# Patient Record
Sex: MALE | Race: WHITE | NOT HISPANIC OR LATINO | Employment: FULL TIME | URBAN - METROPOLITAN AREA
[De-identification: names, ages, dates, MRNs, and addresses within clinical notes are randomized per-mention and may not be internally consistent; named-entity substitution may affect disease eponyms.]

---

## 2017-07-11 ENCOUNTER — OFFICE VISIT - HEALTHEAST (OUTPATIENT)
Dept: INTERNAL MEDICINE | Facility: CLINIC | Age: 47
End: 2017-07-11

## 2017-07-11 DIAGNOSIS — B08.4 HAND, FOOT AND MOUTH DISEASE: ICD-10-CM

## 2017-07-11 DIAGNOSIS — B34.1 COXSACKIE VIRAL DISEASE: ICD-10-CM

## 2017-08-25 ENCOUNTER — COMMUNICATION - HEALTHEAST (OUTPATIENT)
Dept: UROLOGY | Facility: CLINIC | Age: 47
End: 2017-08-25

## 2017-09-08 ENCOUNTER — HOSPITAL ENCOUNTER (OUTPATIENT)
Dept: CT IMAGING | Facility: CLINIC | Age: 47
Discharge: HOME | End: 2017-09-08
Attending: PHYSICIAN ASSISTANT

## 2017-09-08 ENCOUNTER — OFFICE VISIT - HEALTHEAST (OUTPATIENT)
Dept: UROLOGY | Facility: CLINIC | Age: 47
End: 2017-09-08

## 2017-09-08 ENCOUNTER — AMBULATORY - HEALTHEAST (OUTPATIENT)
Dept: UROLOGY | Facility: CLINIC | Age: 47
End: 2017-09-08

## 2017-09-08 DIAGNOSIS — N20.0 CALCULUS OF KIDNEY: ICD-10-CM

## 2017-09-08 DIAGNOSIS — N20.9 URINARY CALCULUS, UNSPECIFIED: ICD-10-CM

## 2017-09-08 DIAGNOSIS — R10.9 LEFT FLANK PAIN: ICD-10-CM

## 2017-12-04 ENCOUNTER — COMMUNICATION - HEALTHEAST (OUTPATIENT)
Dept: UROLOGY | Facility: CLINIC | Age: 47
End: 2017-12-04

## 2017-12-04 ENCOUNTER — AMBULATORY - HEALTHEAST (OUTPATIENT)
Dept: LAB | Facility: HOSPITAL | Age: 47
End: 2017-12-04

## 2017-12-04 DIAGNOSIS — N20.9 URINARY TRACT STONES: ICD-10-CM

## 2017-12-11 ENCOUNTER — AMBULATORY - HEALTHEAST (OUTPATIENT)
Dept: LAB | Facility: HOSPITAL | Age: 47
End: 2017-12-11

## 2017-12-11 ENCOUNTER — COMMUNICATION - HEALTHEAST (OUTPATIENT)
Dept: UROLOGY | Facility: CLINIC | Age: 47
End: 2017-12-11

## 2017-12-11 DIAGNOSIS — N20.9 URINARY TRACT STONES: ICD-10-CM

## 2017-12-13 ENCOUNTER — COMMUNICATION - HEALTHEAST (OUTPATIENT)
Dept: UROLOGY | Facility: CLINIC | Age: 47
End: 2017-12-13

## 2018-02-27 ENCOUNTER — OFFICE VISIT - HEALTHEAST (OUTPATIENT)
Dept: FAMILY MEDICINE | Facility: CLINIC | Age: 48
End: 2018-02-27

## 2018-02-27 DIAGNOSIS — K21.9 GASTROESOPHAGEAL REFLUX DISEASE, ESOPHAGITIS PRESENCE NOT SPECIFIED: ICD-10-CM

## 2018-02-27 DIAGNOSIS — K52.9 GASTROENTERITIS: ICD-10-CM

## 2018-02-27 DIAGNOSIS — R51.9 ACUTE NONINTRACTABLE HEADACHE, UNSPECIFIED HEADACHE TYPE: ICD-10-CM

## 2018-02-27 ASSESSMENT — MIFFLIN-ST. JEOR: SCORE: 1721.29

## 2018-03-02 ENCOUNTER — OFFICE VISIT - HEALTHEAST (OUTPATIENT)
Dept: FAMILY MEDICINE | Facility: CLINIC | Age: 48
End: 2018-03-02

## 2018-03-02 ENCOUNTER — COMMUNICATION - HEALTHEAST (OUTPATIENT)
Dept: FAMILY MEDICINE | Facility: CLINIC | Age: 48
End: 2018-03-02

## 2018-03-02 DIAGNOSIS — R10.9 ABDOMINAL PAIN: ICD-10-CM

## 2018-03-02 DIAGNOSIS — R51.9 HEADACHE: ICD-10-CM

## 2018-03-02 DIAGNOSIS — R20.9 COLD AND CLAMMY SKIN: ICD-10-CM

## 2018-03-02 DIAGNOSIS — R23.1 COLD AND CLAMMY SKIN: ICD-10-CM

## 2018-03-02 LAB
ALBUMIN SERPL-MCNC: 3.6 G/DL (ref 3.5–5)
ALP SERPL-CCNC: 92 U/L (ref 45–120)
ALT SERPL W P-5'-P-CCNC: 837 U/L (ref 0–45)
AMYLASE SERPL-CCNC: 46 U/L (ref 5–120)
ANION GAP SERPL CALCULATED.3IONS-SCNC: 9 MMOL/L (ref 5–18)
AST SERPL W P-5'-P-CCNC: 513 U/L (ref 0–40)
BASOPHILS # BLD AUTO: 0 THOU/UL (ref 0–0.2)
BASOPHILS NFR BLD AUTO: 1 % (ref 0–2)
BILIRUB SERPL-MCNC: 0.5 MG/DL (ref 0–1)
BUN SERPL-MCNC: 10 MG/DL (ref 8–22)
CALCIUM SERPL-MCNC: 8.8 MG/DL (ref 8.5–10.5)
CHLORIDE BLD-SCNC: 101 MMOL/L (ref 98–107)
CO2 SERPL-SCNC: 28 MMOL/L (ref 22–31)
CREAT SERPL-MCNC: 0.76 MG/DL (ref 0.7–1.3)
EOSINOPHIL COUNT (ABSOLUTE): 0 THOU/UL (ref 0–0.4)
EOSINOPHIL NFR BLD AUTO: 1 % (ref 0–6)
ERYTHROCYTE [DISTWIDTH] IN BLOOD BY AUTOMATED COUNT: 12.6 % (ref 11–14.5)
FLUAV AG SPEC QL IA: NORMAL
FLUBV AG SPEC QL IA: NORMAL
GFR SERPL CREATININE-BSD FRML MDRD: >60 ML/MIN/1.73M2
GLUCOSE BLD-MCNC: 72 MG/DL (ref 70–125)
HCT VFR BLD AUTO: 44.6 % (ref 40–54)
HGB BLD-MCNC: 15.4 G/DL (ref 14–18)
LIPASE SERPL-CCNC: 26 U/L (ref 0–52)
LYMPHOCYTES # BLD AUTO: 6.1 THOU/UL (ref 0.8–4.4)
LYMPHOCYTES NFR BLD AUTO: 71 % (ref 20–40)
MCH RBC QN AUTO: 30.5 PG (ref 27–34)
MCHC RBC AUTO-ENTMCNC: 34.5 G/DL (ref 32–36)
MCV RBC AUTO: 88 FL (ref 80–100)
MONOCYTES # BLD AUTO: 0.7 THOU/UL (ref 0–0.9)
MONOCYTES NFR BLD AUTO: 8 % (ref 2–10)
MONOCYTES NFR BLD AUTO: NEGATIVE %
PLAT MORPH BLD: NORMAL
PLATELET # BLD AUTO: 210 THOU/UL (ref 140–440)
PMV BLD AUTO: 10.2 FL (ref 8.5–12.5)
POTASSIUM BLD-SCNC: 4.2 MMOL/L (ref 3.5–5)
PROT SERPL-MCNC: 7.2 G/DL (ref 6–8)
RBC # BLD AUTO: 5.05 MILL/UL (ref 4.4–6.2)
REACTIVE LYMPHS: ABNORMAL
SODIUM SERPL-SCNC: 138 MMOL/L (ref 136–145)
TOTAL NEUTROPHILS-ABS(DIFF): 1.8 THOU/UL (ref 2–7.7)
TOTAL NEUTROPHILS-REL(DIFF): 21 % (ref 50–70)
WBC: 8.7 THOU/UL (ref 4–11)

## 2018-03-03 ENCOUNTER — COMMUNICATION - HEALTHEAST (OUTPATIENT)
Dept: SCHEDULING | Facility: CLINIC | Age: 48
End: 2018-03-03

## 2018-03-07 ENCOUNTER — COMMUNICATION - HEALTHEAST (OUTPATIENT)
Dept: FAMILY MEDICINE | Facility: CLINIC | Age: 48
End: 2018-03-07

## 2018-03-08 ENCOUNTER — OFFICE VISIT - HEALTHEAST (OUTPATIENT)
Dept: FAMILY MEDICINE | Facility: CLINIC | Age: 48
End: 2018-03-08

## 2018-03-08 DIAGNOSIS — R74.8 ELEVATED LIVER ENZYMES: ICD-10-CM

## 2018-03-08 DIAGNOSIS — R89.4 POSITIVE TEST FOR HERPES SIMPLEX VIRUS (HSV) ANTIBODY: ICD-10-CM

## 2018-03-08 DIAGNOSIS — R74.01 TRANSAMINITIS: ICD-10-CM

## 2018-03-08 LAB
ALBUMIN SERPL-MCNC: 3.7 G/DL (ref 3.5–5)
ALP SERPL-CCNC: 99 U/L (ref 45–120)
ALT SERPL W P-5'-P-CCNC: 315 U/L (ref 0–45)
AST SERPL W P-5'-P-CCNC: 58 U/L (ref 0–40)
BASOPHILS # BLD AUTO: 0.2 THOU/UL (ref 0–0.2)
BASOPHILS NFR BLD AUTO: 3 % (ref 0–2)
BILIRUB DIRECT SERPL-MCNC: 0.2 MG/DL
BILIRUB SERPL-MCNC: 0.5 MG/DL (ref 0–1)
EOSINOPHIL COUNT (ABSOLUTE): 0.2 THOU/UL (ref 0–0.4)
EOSINOPHIL NFR BLD AUTO: 4 % (ref 0–6)
ERYTHROCYTE [DISTWIDTH] IN BLOOD BY AUTOMATED COUNT: 12.6 % (ref 11–14.5)
HCT VFR BLD AUTO: 43.5 % (ref 40–54)
HGB BLD-MCNC: 15 G/DL (ref 14–18)
LYMPHOCYTES # BLD AUTO: 2.5 THOU/UL (ref 0.8–4.4)
LYMPHOCYTES NFR BLD AUTO: 39 % (ref 20–40)
MCH RBC QN AUTO: 30.6 PG (ref 27–34)
MCHC RBC AUTO-ENTMCNC: 34.5 G/DL (ref 32–36)
MCV RBC AUTO: 89 FL (ref 80–100)
MONOCYTES # BLD AUTO: 1.3 THOU/UL (ref 0–0.9)
MONOCYTES NFR BLD AUTO: 20 % (ref 2–10)
OVALOCYTES: ABNORMAL
PLAT MORPH BLD: NORMAL
PLATELET # BLD AUTO: 324 THOU/UL (ref 140–440)
PMV BLD AUTO: 9.5 FL (ref 8.5–12.5)
PROT SERPL-MCNC: 7.3 G/DL (ref 6–8)
RBC # BLD AUTO: 4.9 MILL/UL (ref 4.4–6.2)
REACTIVE LYMPHS: ABNORMAL
TOTAL NEUTROPHILS-ABS(DIFF): 2.3 THOU/UL (ref 2–7.7)
TOTAL NEUTROPHILS-REL(DIFF): 36 % (ref 50–70)
WBC: 6.4 THOU/UL (ref 4–11)

## 2018-03-09 ENCOUNTER — COMMUNICATION - HEALTHEAST (OUTPATIENT)
Dept: FAMILY MEDICINE | Facility: CLINIC | Age: 48
End: 2018-03-09

## 2018-03-09 LAB
HSV1 IGG SERPL QL IA: POSITIVE
HSV2 IGG SERPL QL IA: NEGATIVE

## 2018-03-11 LAB — ARUP MISCELLANEOUS TEST: NORMAL

## 2018-03-12 ENCOUNTER — COMMUNICATION - HEALTHEAST (OUTPATIENT)
Dept: FAMILY MEDICINE | Facility: CLINIC | Age: 48
End: 2018-03-12

## 2018-03-12 DIAGNOSIS — R79.89 ELEVATED LFTS: ICD-10-CM

## 2018-03-12 LAB
MISCELLANEOUS TEST DEPT. - HE HISTORICAL: NORMAL
MISCELLANEOUS TEST DEPT. - HE HISTORICAL: NORMAL
PERFORMING LAB: NORMAL
PERFORMING LAB: NORMAL
SPECIMEN STATUS: NORMAL
SPECIMEN STATUS: NORMAL
TEST NAME: NORMAL
TEST NAME: NORMAL

## 2018-03-16 ENCOUNTER — OFFICE VISIT - HEALTHEAST (OUTPATIENT)
Dept: UROLOGY | Facility: CLINIC | Age: 48
End: 2018-03-16

## 2018-03-16 DIAGNOSIS — R34 LOW URINE OUTPUT: ICD-10-CM

## 2018-03-16 DIAGNOSIS — N20.9 URINARY CALCULUS: ICD-10-CM

## 2018-03-16 DIAGNOSIS — N20.9 URINARY TRACT STONES: ICD-10-CM

## 2019-02-05 ENCOUNTER — OFFICE VISIT - HEALTHEAST (OUTPATIENT)
Dept: FAMILY MEDICINE | Facility: CLINIC | Age: 49
End: 2019-02-05

## 2019-02-05 DIAGNOSIS — R07.0 THROAT PAIN: ICD-10-CM

## 2019-02-05 DIAGNOSIS — J02.0 STREP PHARYNGITIS: ICD-10-CM

## 2019-02-05 LAB — DEPRECATED S PYO AG THROAT QL EIA: ABNORMAL

## 2019-02-05 RX ORDER — IBUPROFEN 200 MG
600 TABLET ORAL EVERY 6 HOURS PRN
Status: SHIPPED | COMMUNITY
Start: 2019-02-05 | End: 2022-12-09

## 2019-02-05 RX ORDER — ACETAMINOPHEN 325 MG/1
650 TABLET ORAL EVERY 6 HOURS PRN
Status: SHIPPED | COMMUNITY
Start: 2019-02-05 | End: 2022-12-09

## 2019-02-05 ASSESSMENT — MIFFLIN-ST. JEOR: SCORE: 1696.23

## 2019-05-06 ENCOUNTER — OFFICE VISIT - HEALTHEAST (OUTPATIENT)
Dept: FAMILY MEDICINE | Facility: CLINIC | Age: 49
End: 2019-05-06

## 2019-05-06 DIAGNOSIS — R74.8 ELEVATED LIVER ENZYMES: ICD-10-CM

## 2019-05-06 DIAGNOSIS — F90.9 ATTENTION DEFICIT HYPERACTIVITY DISORDER (ADHD), UNSPECIFIED ADHD TYPE: ICD-10-CM

## 2019-05-06 DIAGNOSIS — Z87.19 HISTORY OF ULCERATIVE COLITIS: ICD-10-CM

## 2019-05-06 LAB
AMPHETAMINES UR QL SCN: NORMAL
BARBITURATES UR QL: NORMAL
BENZODIAZ UR QL: NORMAL
CANNABINOIDS UR QL SCN: NORMAL
COCAINE UR QL: NORMAL
CREAT UR-MCNC: 105.5 MG/DL
METHADONE UR QL SCN: NORMAL
OPIATES UR QL SCN: NORMAL
OXYCODONE UR QL: NORMAL
PCP UR QL SCN: NORMAL

## 2019-05-08 ENCOUNTER — COMMUNICATION - HEALTHEAST (OUTPATIENT)
Dept: FAMILY MEDICINE | Facility: CLINIC | Age: 49
End: 2019-05-08

## 2019-05-15 ENCOUNTER — OFFICE VISIT - HEALTHEAST (OUTPATIENT)
Dept: FAMILY MEDICINE | Facility: CLINIC | Age: 49
End: 2019-05-15

## 2019-05-15 DIAGNOSIS — J01.90 ACUTE SINUSITIS WITH SYMPTOMS > 10 DAYS: ICD-10-CM

## 2019-05-15 ASSESSMENT — MIFFLIN-ST. JEOR: SCORE: 1690.56

## 2019-05-16 ENCOUNTER — COMMUNICATION - HEALTHEAST (OUTPATIENT)
Dept: FAMILY MEDICINE | Facility: CLINIC | Age: 49
End: 2019-05-16

## 2019-07-10 ENCOUNTER — COMMUNICATION - HEALTHEAST (OUTPATIENT)
Dept: FAMILY MEDICINE | Facility: CLINIC | Age: 49
End: 2019-07-10

## 2019-07-10 DIAGNOSIS — F90.9 ATTENTION DEFICIT HYPERACTIVITY DISORDER (ADHD), UNSPECIFIED ADHD TYPE: ICD-10-CM

## 2020-01-24 ENCOUNTER — COMMUNICATION - HEALTHEAST (OUTPATIENT)
Dept: FAMILY MEDICINE | Facility: CLINIC | Age: 50
End: 2020-01-24

## 2020-01-24 DIAGNOSIS — F90.9 ATTENTION DEFICIT HYPERACTIVITY DISORDER (ADHD), UNSPECIFIED ADHD TYPE: ICD-10-CM

## 2020-02-14 ENCOUNTER — COMMUNICATION - HEALTHEAST (OUTPATIENT)
Dept: FAMILY MEDICINE | Facility: CLINIC | Age: 50
End: 2020-02-14

## 2020-02-14 DIAGNOSIS — F90.9 ATTENTION DEFICIT HYPERACTIVITY DISORDER (ADHD), UNSPECIFIED ADHD TYPE: ICD-10-CM

## 2020-02-25 ENCOUNTER — OFFICE VISIT - HEALTHEAST (OUTPATIENT)
Dept: FAMILY MEDICINE | Facility: CLINIC | Age: 50
End: 2020-02-25

## 2020-02-25 DIAGNOSIS — Z90.49 HISTORY OF TOTAL COLECTOMY: ICD-10-CM

## 2020-02-25 DIAGNOSIS — E66.3 OVERWEIGHT (BMI 25.0-29.9): ICD-10-CM

## 2020-02-25 DIAGNOSIS — Z09 HOSPITAL DISCHARGE FOLLOW-UP: ICD-10-CM

## 2020-02-25 DIAGNOSIS — R21 RASH AND NONSPECIFIC SKIN ERUPTION: ICD-10-CM

## 2020-02-25 DIAGNOSIS — R74.01 ELEVATED ALT MEASUREMENT: ICD-10-CM

## 2020-02-25 DIAGNOSIS — K56.609 SBO (SMALL BOWEL OBSTRUCTION) (H): ICD-10-CM

## 2020-02-25 LAB
CHOLEST SERPL-MCNC: 231 MG/DL
FASTING STATUS PATIENT QL REPORTED: NO
FERRITIN SERPL-MCNC: 31 NG/ML (ref 27–300)
HBA1C MFR BLD: 5.1 % (ref 3.5–6)
HDLC SERPL-MCNC: 56 MG/DL
IRON SATN MFR SERPL: 22 % (ref 20–50)
IRON SERPL-MCNC: 89 UG/DL (ref 42–175)
LDLC SERPL CALC-MCNC: 131 MG/DL
TIBC SERPL-MCNC: 406 UG/DL (ref 313–563)
TRANSFERRIN SERPL-MCNC: 325 MG/DL (ref 212–360)
TRIGL SERPL-MCNC: 222 MG/DL

## 2020-02-25 RX ORDER — KETOCONAZOLE 20 MG/G
CREAM TOPICAL 2 TIMES DAILY
Qty: 30 G | Refills: 1 | Status: SHIPPED | OUTPATIENT
Start: 2020-02-25 | End: 2022-12-09

## 2020-02-26 LAB
25(OH)D3 SERPL-MCNC: 34.1 NG/ML (ref 30–80)
25(OH)D3 SERPL-MCNC: 34.1 NG/ML (ref 30–80)

## 2020-02-28 LAB
MAGNESIUM,RBC - HISTORICAL: 5.3 MG/DL (ref 3.5–7.1)
SPECIMEN STATUS: NORMAL

## 2020-09-11 ENCOUNTER — COMMUNICATION - HEALTHEAST (OUTPATIENT)
Dept: SCHEDULING | Facility: CLINIC | Age: 50
End: 2020-09-11

## 2020-09-11 ENCOUNTER — OFFICE VISIT - HEALTHEAST (OUTPATIENT)
Dept: INTERNAL MEDICINE | Facility: CLINIC | Age: 50
End: 2020-09-11

## 2020-09-11 DIAGNOSIS — Z91.199 NO-SHOW FOR APPOINTMENT: ICD-10-CM

## 2020-09-23 ENCOUNTER — COMMUNICATION - HEALTHEAST (OUTPATIENT)
Dept: FAMILY MEDICINE | Facility: CLINIC | Age: 50
End: 2020-09-23

## 2021-05-28 NOTE — PROGRESS NOTES
"Assessment / Impression     1. Acute sinusitis with symptoms > 10 days  amoxicillin-clavulanate (AUGMENTIN) 875-125 mg per tablet         Plan:     He was given a prescription for Augmentin.  I encouraged rest and hydration.  He may take Tylenol or ibuprofen as needed.  He may continue decongestants as needed.    Subjective:      HPI: Adam Deras is a 48 y.o. male who presents to the clinic complaining of worsening sinus pain, congestion and pressure over the past 2 weeks.  He denies having fevers.  He denies having chest congestion feeling acutely short of breath.  He has noticed some ear plugging and pain into the teeth.      Review of Systems  Pertinent items are noted in HPI.       Objective:     /84 (Patient Site: Right Arm, Patient Position: Sitting, Cuff Size: Adult Regular)   Pulse 72   Temp 97.9  F (36.6  C) (Oral)   Resp 16   Ht 5' 9.25\" (1.759 m)   Wt 183 lb 4 oz (83.1 kg)   BMI 26.87 kg/m      General Appearance: Alert, cooperative, appears slightly fatigued.  Head: Normocephalic, without obvious abnormality, atraumatic.  Frontal and maxillary sinuses tender to palpation  Eyes: PERRL, conjunctiva/corneas clear, EOM's intact.  Ears: Normal TM's and external ear canals, both ears.  Throat: Slightly erythematous. No exudates.  Neck: Supple, symmetrical, trachea midline, no lymphadenopathy.  Lungs: Clear to auscultation bilaterally, respirations unlabored.  Heart:: Regular rate and rhythm.  Extremities: Extremities normal, atraumatic, no cyanosis or edema.        No results found for this or any previous visit (from the past 168 hour(s)).      "

## 2021-05-28 NOTE — PROGRESS NOTES
Assessment:     1. Attention deficit hyperactivity disorder (ADHD), unspecified ADHD type  Ambulatory referral to Psychology    methylphenidate HCl (RITALIN) 10 MG tablet    Drug Abuse 1+, Urine   2. Elevated liver enzymes     3. History of ulcerative colitis       PHQ and KELLY screening reviewed.  Unlikely, that depression or anxiety can be contributing to current lack of concentration.  Definitely fatigue can be contributing and may need further work-up if it is other than lack of sleep.  This is discussed with the patient.  At this time I will try a low-dose Ritalin to see if it helps, meanwhile we will do a referral for neuropsych evaluation.  Patient is agreeable to this plan of care.    Patient does have a history of ulcerative colitis.  Last year during an acute episode of abdominal pain, he was hospitalized after elevated liver enzymes were noted.  This was thought to be a viral disease.  I am unable to find any follow-up in the media, I am going to send a message to the patient regarding follow-up for this.     Plan:      The diagnosis was discussed with the patient and evaluation and treatment plans outlined.   Patient Instructions   I am seeing you today for starting Ritalin that you were on many years ago.    We will need a neuropsych evaluation for diagnosis of ADD versus ADHD versus any underlying mental health disorder for long-term use.    A controlled substance agreement will be signed today in addition to urine drug screen.    Maggi Moss MD  5/6/2019                   Subjective:      Adam Deras is a  male who presents for evaluation of   Chief Complaint   Patient presents with     Medication Management     Medication for ADD     Adam is here today with concerns of feeling of lack of concentration.  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  Most of it he thinks may be due to fatigue.  He has a set of twins who are 2 years old and he feels that 1 of them  has not been sleeping through the night over the last 3 months and he has been exhausted.    On the other hand, he describes that he was 1 of the trial patient who was started on Ritalin more than 25 years ago.  However, he did not pursue with it later.  He was diagnosed with ADD per history.  No recent follow-ups.    No concerns with substance abuse.    The following portions of the patient's history were reviewed and updated as appropriate: allergies, current medications, past family history, past medical history, past social history, past surgical history and problem list.  No Known Allergies    Current Outpatient Medications on File Prior to Visit   Medication Sig Dispense Refill     acetaminophen (TYLENOL) 325 MG tablet Take 650 mg by mouth every 6 (six) hours as needed for pain.       ibuprofen (ADVIL,MOTRIN) 200 MG tablet Take 800 mg by mouth every 6 (six) hours as needed for pain.       omeprazole (PRILOSEC) 20 MG capsule Take 1 capsule (20 mg total) by mouth daily. 30 capsule 1     pseudoephedrine (SUDAFED) 120 mg 12 hr tablet Take 120 mg by mouth every 12 (twelve) hours.       No current facility-administered medications on file prior to visit.        Patient Active Problem List   Diagnosis     History of ulcerative colitis     History of total colectomy     Anemia     Nephrolithiasis     Leg weakness, bilateral     Renal colic on left side     Elevated liver enzymes     Right upper quadrant abdominal pain     Positive test for herpes simplex virus (HSV) antibody       Past Medical History:   Diagnosis Date     Anemia      Kidney stone     3/2016     Nephrolithiasis      Ulcerative colitis (H) 2001    History of        Past Surgical History:   Procedure Laterality Date     TOTAL COLECTOMY         Family History   Problem Relation Age of Onset     Dementia Paternal Uncle      Breast cancer Maternal Grandmother      Rheum arthritis Mother        Social History     Socioeconomic History     Marital status:       Spouse name: None     Number of children: None     Years of education: None     Highest education level: None   Occupational History     Occupation: not employed    Social Needs     Financial resource strain: None     Food insecurity:     Worry: None     Inability: None     Transportation needs:     Medical: None     Non-medical: None   Tobacco Use     Smoking status: Never Smoker     Smokeless tobacco: Never Used   Substance and Sexual Activity     Alcohol use: Yes     Comment: 4 pints a week     Drug use: No     Sexual activity: None   Lifestyle     Physical activity:     Days per week: None     Minutes per session: None     Stress: None   Relationships     Social connections:     Talks on phone: None     Gets together: None     Attends Qu[employer] service: None     Active member of club or organization: None     Attends meetings of clubs or organizations: None     Relationship status: None     Intimate partner violence:     Fear of current or ex partner: None     Emotionally abused: None     Physically abused: None     Forced sexual activity: None   Other Topics Concern     None   Social History Narrative    Works as a .    He is  and lives with his wife and twins who are 1-year-old.            Maggi Moss MD  3/7/2018           Review of Systems  Constitutional: negative  Cardiovascular: negative  Gastrointestinal: negative       Objective:     /88 (Patient Site: Left Arm, Patient Position: Sitting, Cuff Size: Adult Regular)   Pulse 70   Temp 97.7  F (36.5  C) (Oral)   Wt 190 lb 12.8 oz (86.5 kg)   BMI 27.97 kg/m      General Appearance: healthy, alert, oriented and no distress  Good eye contact, normal speech and content, no flight of ideas. Animated during conversation.  Neurological exam: gait normal, alert and oriented X 3    Little interest or pleasure in doing things: Several days  Feeling down, depressed, or hopeless: Several days  Trouble falling or  staying asleep, or sleeping too much: Not at all  Feeling tired or having little energy: Not at all  Poor appetite or overeating: Not at all  Feeling bad about yourself - or that you are a failure or have let yourself or your family down: More than half the days  Trouble concentrating on things, such as reading the newspaper or watching television: More than half the days  Moving or speaking so slowly that other people could have noticed. Or the opposite - being so fidgety or restless that you have been moving around a lot more than usual: Not at all  Thoughts that you would be better off dead, or of hurting yourself in some way: Not at all  PHQ-9 Total Score: 6  If you checked off any problems, how difficult have these problems made it for you to do your work, take care of things at home, or get along with other people?: Somewhat difficult    Total KELLY 7 Score       5/6/2019             KELLY 7 Total Score:  5

## 2021-05-28 NOTE — PATIENT INSTRUCTIONS - HE
I am seeing you today for starting Ritalin that you were on many years ago.    We will need a neuropsych evaluation for diagnosis of ADD versus ADHD versus any underlying mental health disorder for long-term use.    A controlled substance agreement will be signed today in addition to urine drug screen.    Maggi Moss MD  5/6/2019

## 2021-05-28 NOTE — TELEPHONE ENCOUNTER
Question following Office Visit  When did you see your provider: yesterday  What is your question: Patient is requesting Steve Bell DO to return his call regarding Sx. Patient declined to talk to triage and stated this medication is helping a little but not much and now I am having jaw pain.  Patient was advised this medication is prescribed for 10 days and will need longer to work but patient stated I just want a call back to discuss this with Steve Bell DO.    Okay to leave a detailed message: Yes

## 2021-05-28 NOTE — TELEPHONE ENCOUNTER
I spoke to the patient about his symptoms.  He is going to start taking ibuprofen 600-800 mg 3 times daily as needed over the next few days.  He may alternate with Tylenol.  He reports that symptoms seem to be starting to improve since he started the antibiotic yesterday.  He will let me know if symptoms do not continue to improve over the next few days.

## 2021-05-30 NOTE — TELEPHONE ENCOUNTER
Controlled Substance Refill Request  Medication:   Requested Prescriptions     Pending Prescriptions Disp Refills     methylphenidate HCl (RITALIN) 10 MG tablet 30 tablet 0     Sig: Take 1 tablet (10 mg total) by mouth daily.     Date Last Fill: 5/6/19  Pharmacy: [id number]   Submit electronically to pharmacy  Controlled Substance Agreement on File:   Encounter-Level CSA Scan Date:    There are no encounter-level csa scan date.       Last office visit: Last office visit pertaining to requested medication was 5/15/19.

## 2021-05-30 NOTE — TELEPHONE ENCOUNTER
Medication:   Requested Prescriptions     Pending Prescriptions Disp Refills     methylphenidate HCl (RITALIN) 10 MG tablet 30 tablet 0     Sig: Take 1 tablet (10 mg total) by mouth daily.         Last Date Filled 5/6/19 according to our records     pulled: NO  Have not been granted access yet      Only PCP Prescribing?: unknown     Taken as prescribed from physician notes?: YES  PHQ and KELLY screening reviewed.  Unlikely, that depression or anxiety can be contributing to current lack of concentration.  Definitely fatigue can be contributing and may need further work-up if it is other than lack of sleep.  This is discussed with the patient.  At this time I will try a low-dose Ritalin to see if it helps, meanwhile we will do a referral for neuropsych evaluation.  Patient is agreeable to this plan of care.    CSA in last year: YES    Random Utox in last year: YES    Opioids + benzodiazepines? NO

## 2021-05-31 VITALS — BODY MASS INDEX: 26.55 KG/M2 | WEIGHT: 179.8 LBS

## 2021-05-31 VITALS — BODY MASS INDEX: 26.75 KG/M2 | WEIGHT: 186.44 LBS

## 2021-05-31 VITALS — BODY MASS INDEX: 27 KG/M2 | WEIGHT: 188.2 LBS

## 2021-05-31 VITALS — WEIGHT: 187.4 LBS | BODY MASS INDEX: 26.83 KG/M2 | HEIGHT: 70 IN

## 2021-06-02 VITALS — WEIGHT: 183.25 LBS | BODY MASS INDEX: 27.14 KG/M2 | HEIGHT: 69 IN

## 2021-06-02 VITALS — WEIGHT: 184.5 LBS | BODY MASS INDEX: 27.33 KG/M2 | HEIGHT: 69 IN

## 2021-06-02 VITALS — WEIGHT: 190.8 LBS | BODY MASS INDEX: 27.97 KG/M2

## 2021-06-04 VITALS
RESPIRATION RATE: 16 BRPM | DIASTOLIC BLOOD PRESSURE: 95 MMHG | HEART RATE: 92 BPM | BODY MASS INDEX: 27.69 KG/M2 | SYSTOLIC BLOOD PRESSURE: 134 MMHG | WEIGHT: 193 LBS | TEMPERATURE: 98.7 F

## 2021-06-05 NOTE — TELEPHONE ENCOUNTER
I do not feel comfortable doing this referral.  Refer to last note for details.  I believe this message has been relayed to the patient     Maggi Moss MD  1/26/2020

## 2021-06-05 NOTE — TELEPHONE ENCOUNTER
15 pills of Ritalin have been sent to the pharmacy.  For more refills, he should follow in clinic with me at least.    Maggi Moss MD  1/27/2020

## 2021-06-05 NOTE — TELEPHONE ENCOUNTER
Controlled Substance Refill Request  Medication Name:   Requested Prescriptions     Pending Prescriptions Disp Refills     methylphenidate HCl (RITALIN) 10 MG tablet 30 tablet 0     Sig: Take 1 tablet (10 mg total) by mouth daily.     Date Last Fill: 07/10/19  [facility]ed [facility]  Submit electronically to pharmacy  Controlled Substance Agreement on file:   Encounter-Level CSA Scan Date:    There are no encounter-level csa scan date.        Last office visit:  05/15/19

## 2021-06-05 NOTE — TELEPHONE ENCOUNTER
Pt was asking if you would be willing to bridge medication until he see psych. Please see messages below.

## 2021-06-05 NOTE — TELEPHONE ENCOUNTER
Spoke to pt and relayed MD message. Pt will call psych and set up appt today or tomorrow. I told pt we will need the report and he will need to see Dr ATKINSON after to sign CSA and do UDS. Please send limited refill

## 2021-06-05 NOTE — TELEPHONE ENCOUNTER
Patient can make a follow-up appointment to discuss this.  I can give a limited refill until he gets to see a specialist.    Maggi Moss MD  1/27/2020

## 2021-06-05 NOTE — TELEPHONE ENCOUNTER
Will let pt know that yes he needs to do eval and then see Dr ATKINSON to review report and sign CSA. Ok to bridge until appt?

## 2021-06-05 NOTE — TELEPHONE ENCOUNTER
Decline Rx refill.  Patient is to be seen.      He was evaluated last year 05/2019 and was given a trial of medication which was then refilled by another provider.  He had been referred for neuropsych evaluation.  I do not have a report if he has been followed by the specialist and an evaluation done .    He has not been seen since.    Maggi Moss MD  1/24/2020

## 2021-06-05 NOTE — TELEPHONE ENCOUNTER
Left message to call back for: Refill  Information to relay to patient:  MD message below. Please ask pt if he ever had neuro psych eval and if so where so we can get records

## 2021-06-06 NOTE — TELEPHONE ENCOUNTER
Referral Request  Type of referral: Mental health for meds  Who s requesting: Patient  Why the request: He states he needs a referral to get his medication . Patient very rude and rachelle over the phone to writer.  Please advise.   Have you been seen for this request: N/A  Does patient have a preference on a group/provider? No  Okay to leave a detailed message?  No

## 2021-06-06 NOTE — TELEPHONE ENCOUNTER
Ref to psych is done    Maggi Moss MD  2/15/2020    P.S: sent him my chart message that he can call his insurance too , otherwise scheduling will reach out to him.

## 2021-06-06 NOTE — PROGRESS NOTES
Hospital Follow-up Visit:    Assessment/Plan:     1. Hospital discharge follow-up     2. Overweight (BMI 25.0-29.9)  Glycosylated Hemoglobin A1c    Vitamin D, Total (25-Hydroxy)    Magnesium, Red Blood Cell    Iron and Transferrin Iron Binding Capacity    Ferritin    Lipid Cascade   3. Elevated ALT measurement     4. History of total colectomy  Ambulatory referral to General Surgery   5. Rash and nonspecific skin eruption  ketoconazole (NIZORAL) 2 % cream   6. SBO (small bowel obstruction) (H)       Patient here today for follow-up of hospital discharge for small bowel obstruction.  At this time he is recovering well.    He is concerned  overall with weight gain, and in general feeling as if he is breaking apart.  He is following with a holistic medicine/nutritionist and would like to get some baseline testing for hemoglobin A1c, vitamin D, red blood cell magnesium and iron studies.  These were ordered for him.  Though he denies depression and answers    His rash appears to be severe dermatitis and I have prescribed Nizoral.  If he has no benefit, he should let me know and I will make a referral to dermatology.    We did go over his hospital discharge where it is mentioned that he may have had some anger issues.  I did discuss that Ritalin that has been prescribed in the past for his ADHD symptoms is a stimulant and as advised in the past he should have evaluation for ADHD before further refills of medication.  He agrees with the plan.        Greater than 40 minutes was spent today in interview and examination with more than 50% of that time in counseling and coordination of care addressing recent hospital discharge for small bowel obstruction, supportive dermatitis, concerns with ADHD and need to follow-up for evaluation     Subjective:     Chief Complaint   Patient presents with     Follow-up     SMO at [facility] from 02/20-02/22,     Concerns     Patient is not fasting, would like tests done     Eczema     Dry  skin around the nose. x 4 months       Adam Deras is a 49 y.o. male who presents for a hospital discharge follow up.    He was admitted at [facility] for abdominal pain that was fairly intense accompanied by abdominal distention and bloating.  He has had previous small bowel obstruction related to his colectomy for ulcerative colitis.  He was admitted to hospital for conservative management and discharged after he was back to baseline.  During course of admission he had CT scan that showed bowel obstruction at the site of previous surgery.  He also had abdominal x-ray and contrast x-ray showing an adynamic ileus.  Currently he denies any abdominal pain.  His stools have returned to his baseline.    Patient has had some rash around his nose which is yellowish in color and looks scabby.  He is currently not using any medication for this.  He has had similar rash on the forehead line.    Hospital/Nursing Home/IP Rehab Facility: [facility]  Date of Admission: 2/20/2020  Date of Discharge:2/22  Reason(s) for Admission: Abdominal pain, small bowel obstruction.            Do you have any problems taking your medication regularly?  None       Have you had any changes in your medication since discharge? None       Have you had any difficulty following your discharge or treatment plan?  No    Summary of hospitalization:  Hospital discharge summary reviewed  Diagnostic Tests/Treatments reviewed.  Follow up needed: Suggested follow-up with surgeon.  Other Healthcare Providers Involved in Patient's Care: Patient Care Team:  Maggi Moss MD as PCP - General (Family Medicine)  Maggi Moss MD as Assigned PCP      Update since discharge: {improved   Information from family, SNF, care coordination:      Post Discharge Medication Reconciliation: discharge medications reconciled, continue medications without change  Plan of care communicated with: patient    Objective:     Vitals:    02/25/20 1340   BP:  (!) 134/95   Pulse: 92   Resp: 16   Temp: 98.7  F (37.1  C)   TempSrc: Oral   Weight: 193 lb (87.5 kg)         Physical Exam:  GENERAL APPEARANCE:  Appearing stated age, smiling, alert, cooperative, and in no acute distress.   HEENT: Pupils equal, regular, react to light and accommodation. Extraocular muscles intact, fundi benign. Ear canals and tympanic membranes are normal. Lips, mouth, and throat are unremarkable.   NECK: Neck supple without adenopathy, thyromegaly or masses.   LYMPH: No anterior cervical or supraclavicular LN enlargement   PULMONARY: Normal respiratory effort. Chest is clear.   CARDIOVASCULAR: Heart auscultation: rhythm regular, heart sounds normal S1 and S2, bruit carotid artery absent.   SKIN: Warm and well perfused..   ABDOMEN: Abdomen soft, non-tender. BS normal. No masses or organomegaly.   EXTREMITIES: Peripheral pulses are full. Extremities with no edema.   MENTAL STATUS: Alert, oriented and thought content appropriate   NEUROLOGIC: Station and gait normal, strength and movement normal, reflexes are normal and symmetric         Coding guidelines for this visit:  Type of Medical   Decision Making Face-to-Face Visit       within 7 Days of discharge Face-to-Face Visit        within 14 days of discharge   Moderate Complexity [id number]   High Complexity [id number]       Electronically signed by Maggi Moss MD 03/01/20 1:59 PM

## 2021-06-11 NOTE — TELEPHONE ENCOUNTER
COVID 19 Nurse Triage Plan/Patient Instructions    Please be aware that novel coronavirus (COVID-19) may be circulating in the community. If you develop symptoms such as fever, cough, or SOB or if you have concerns about the presence of another infection including coronavirus (COVID-19), please contact your health care provider or visit [email or web address].     Disposition/Instructions    Virtual Visit with provider recommended. Reference Visit Selection Guide.    Thank you for taking steps to prevent the spread of this virus.  o Limit your contact with others.  o Wear a simple mask to cover your cough.  o Wash your hands well and often.    Resources    [facility]: About COVID-19: [email or web address]    CDC: What to Do If You're Sick: www.cdc.gov/coronavirus/2019-ncov/about/steps-when-sick.html    CDC: Ending Home Isolation: www.cdc.gov/coronavirus/2019-ncov/hcp/disposition-in-home-patients.html     CDC: Caring for Someone: www.cdc.gov/coronavirus/2019-ncov/if-you-are-sick/care-for-someone.html     [facility]: Interim Guidance for Hospital Discharge to Home: www.[email or web address]/[email or web address]    [facility] clinical trials (COVID-19 research studies): clinicalaffairs.[facility]-clinical-trials     Below are the COVID-19 hotlines at the [place] Department [facility] ([facility]). Interpreters are available.   o For health questions: Call 607-422-2651 or 1-568.671.8836 (7 a.m. to 7 p.m.)  o For questions about schools and childcare: Call 268-114-1173 or 1-663.618.6554 (7 a.m. to 7 p.m.)         RN triage   Pt states been feeling ' woozy and tired' for the past few days   Feeling ' off'   Concerned about covid  Pt denies feeling dizzy or lightheaded-- but feels like he needs to sit down / rest sometimes  No fever   No cough   No chest symptoms   Pt states he is breathing OK   Reviewed home care advice and isolation advice   Transferred to    Marizol Ingram RN [facility]  Connection RN triage      Reason for Disposition    [1] COVID-19 infection suspected by caller or triager AND [2] mild symptoms (cough, fever, or others) AND [3] no complications or SOB    Additional Information    Negative: SEVERE difficulty breathing (e.g., struggling for each breath, speaks in single words)    Negative: Difficult to awaken or acting confused (e.g., disoriented, slurred speech)    Negative: Bluish (or gray) lips or face now    Negative: Shock suspected (e.g., cold/pale/clammy skin, too weak to stand, low BP, rapid pulse)    Negative: Sounds like a life-threatening emergency to the triager    Negative: [1] COVID-19 exposure AND [2] no symptoms    Negative: COVID-19 and Breastfeeding, questions about    Negative: [1] Adult with possible COVID-19 symptoms AND [2] triager concerned about severity of symptoms or other causes    Negative: SEVERE or constant chest pain or pressure (Exception: mild central chest pain, present only when coughing)    Negative: MODERATE difficulty breathing (e.g., speaks in phrases, SOB even at rest, pulse 100-120)    Negative: MILD difficulty breathing (e.g., minimal/no SOB at rest, SOB with walking, pulse <100)    Negative: Chest pain or pressure    Negative: HIGH RISK patient (e.g., age > 64 years, diabetes, heart or lung disease, weak immune system) (Exception: Has already been evaluated by healthcare provider and has no new or worsening symptoms)    Negative: Fever present > 3 days (72 hours)    Negative: [1] Fever returns after gone for over 24 hours AND [2] symptoms worse or not improved    Negative: [1] Continuous (nonstop) coughing interferes with work or school AND [2] no improvement using cough treatment per protocol    Protocols used: CORONAVIRUS (COVID-19) DIAGNOSED OR [id number] 8.4.20

## 2021-06-12 NOTE — PROGRESS NOTES
Assessment/Plan:        Diagnoses and all orders for this visit:    Calculus of kidney  -     24 Hour Urine Collection Steps Education  -     Symptom Control While Passing a Stone Education  -     HYDROmorphone (DILAUDID) 2 MG tablet; Take 1-2 tablets (2-4 mg total) by mouth every 4 (four) hours as needed for pain.  Dispense: 40 tablet; Refill: 0  -     ketorolac (TORADOL) 10 mg tablet; Take 1 tablet (10 mg total) by mouth every 6 (six) hours as needed.  Dispense: 20 tablet; Refill: 0    Urinary calculus, unspecified  -     Urinalysis Macroscopic      Stone Management Plan  [facility] Stone Management 11/30/2016 9/8/2017   Urinary Tract Infection No suspicion of infection No suspicion of infection   Renal Colic Asymptomatic at this time Well controlled symptoms   Renal Failure No suspicion of renal failure No suspicion of renal failure   Current CT date 11/28/2016 9/8/2017   Right sided stones? Yes -   R Number of ureteral stones 1 -   R GSD of ureteral stones 4 -   R Location of ureteral stone Distal -   R Number of kidney stones  2 -   R GSD of kidney stones 2 - 4 -   R Hydronephrosis Moderate -   R Stone Event New stone passed prior to visit -   Diagnosis date 11/28/2016 -   R Current Plan Observe -   Observe rationale Limited stone burden with good prognosis for spontaneous passage -   Left sided stones? Yes -   L Number of ureteral stones No ureteral stones -   L Number of kidney stones  1 -   L GSD of kidney stones 4 - 10 -   L Hydronephrosis None -   L Stone Event No current event -   L Current Plan Observe -   Observe rationale Limited stone burden with good prognosis for spontaneous passage -           Subjective:      HPI  Mr. Adam Deras is a 46 y.o.  male returning to the [facility] [place] for unanticipated visit with acute exacerbation of chronic stone disease.      He is a recurrent unidentified composition stone former who has not required stone clearance procedures. He has not  previously participated in stone risk evaluation. He has no identified modifiable stone risk factors. He has identified non-modifiable stone risks including:  multiple stones at presentation, bilateral stones, ulcerative colitis and colectomy.    Adam was last seen in November 2016 following spontaneous passage of a 3 mm right distal ureteral stone. Imaging at the time was notable for additional small bilateral renal stones. He was to initiate prevention workup but did not complete.    Primary symptom occurring 2 weeks ago was acute onset, severe left flank pain x few hours. The pain was a constant, sharp and cramping with radiation to left abdomen. It felt similar to prior kidney stone pain. He took percocet with no relief. He had associated nausea. Evaluation in ED on 8/28/17 was remarkable for hematuria with no signs of infection. A renal ultrasound was negative for hydronephrosis. He was discharged home with additional percocet. He assumes he passed a stone a couple days later, but did not retrieve a specimen. He is asymptomatic at present. He denies symptoms of fever, chills, flank pain, nausea, vomiting, urinary frequency and dysuria.    Sent patient down for updated imaging. CT scan from today is personally reviewed and demonstrates similar bilateral renal stone burden compared to 2016 imaging. There are two left upper pole renal calculi (< 2 mm and 4 mm). There are two right upper pole calculi, both 3 mm. No ureteral stones. No hydronephrosis.    Significant labs from presentation include severe hematuria, no pyuria, negative nitrite, no bacteria, normal C reactive protein, normal creatinine and normal potassium. Serum stone risk chemistries including PTH and ionized calcium from 11/30/16 were are normal.    PLAN    45 yo M with hx of ulcerative colitis s/p colectomy 2011 with first stone event in 2016, hx of stone passage x 2. Acute left flank pain 2 weeks ago requiring ED visit. No current hydronephrosis  with stable bilateral nephrolithiasis.    Recommend completing initial comprehensive stone risk evaluation. Two 24 hour urine collections and dietary journal will be obtained at earliest covenience. Patient verbalized understanding. Patient agrees with plan as discussed. He will return to clinic when labs are available.      He was prescribed toradol and dilaudid for future stone events. He was educated on symptom management protocol.    Greater than 25 minutes spent with patient and wife discussing evaluation and recommendations greater than 50% of the time spent in counseling patient    Patient also seen and examined by KELLIE Salas   Review of systems is negative except for HPI.    Past Medical History:   Diagnosis Date     Anemia      Kidney stone     3/2016     Nephrolithiasis      Ulcerative colitis 2001    History of      Past Surgical History:   Procedure Laterality Date     TOTAL COLECTOMY       Current Outpatient Prescriptions   Medication Sig Dispense Refill     ferrous sulfate 325 (65 FE) MG tablet Take 1 tablet by mouth 2 (two) times a day. 180 tablet 2     HYDROcodone-acetaminophen 5-325 mg per tablet Take 1 tablet by mouth every 6 (six) hours as needed for pain. 15 tablet 0     oxyCODONE-acetaminophen (PERCOCET) 5-325 mg per tablet Take 1 tablet by mouth every 4 (four) hours as needed for pain. 15 tablet 0     tamsulosin (FLOMAX) 0.4 mg Cp24 Take 1 capsule (0.4 mg total) by mouth daily. 5 capsule 0     No current facility-administered medications for this visit.      No Known Allergies    Social History     Social History     Marital status:      Spouse name: N/A     Number of children: N/A     Years of education: N/A     Occupational History     not employed       Social History Main Topics     Smoking status: Never Smoker     Smokeless tobacco: Not on file     Alcohol use Yes      Comment: social     Drug use: No     Sexual activity: Not on file     Other Topics Concern     Not  on file     Social History Narrative     Family History   Problem Relation Age of Onset     Dementia Paternal Uncle      Breast cancer Maternal Grandmother      Rheum arthritis Mother      Objective:      Physical Exam  There were no vitals filed for this visit.  General - well developed, well nourished, appropriate for age. Appears no distress at this time  Abdomen - slender soft, non-tender, no hepatosplenomegaly, no masses.   - no flank tenderness, no suprapubic tenderness, kidney and bladder non-palpable  MSK - normal spinal curvature. no spinal tenderness. normal gait. muscular strength intact.  Psych - oriented to time, place, and person, normal mood and affect.    Labs   Urinalysis POC (Office):  Blood UA   Date Value Ref Range Status   11/30/2016 Small Negative Final     Nitrite, UA   Date Value Ref Range Status   08/25/2017 Negative Negative Final   11/30/2016 Negative Negative Final   11/28/2016 Negative Negative Final   03/27/2016 Negative Negative Final     Leukocytes, UA    Date Value Ref Range Status   11/30/2016 Negative Negative Final     pH UA   Date Value Ref Range Status   11/30/2016 5.5 4.5 - 8.0 Final       Lab Urinalysis:  Blood, UA   Date Value Ref Range Status   08/25/2017 Large (!) Negative Final   11/28/2016 Small (!) Negative Final   03/27/2016 Negative Negative Final     Nitrite, UA   Date Value Ref Range Status   08/25/2017 Negative Negative Final   11/30/2016 Negative Negative Final   11/28/2016 Negative Negative Final   03/27/2016 Negative Negative Final     Leukocytes, UA   Date Value Ref Range Status   08/25/2017 Negative Negative Final   11/28/2016 Negative Negative Final   03/27/2016 Negative Negative Final     pH, UA   Date Value Ref Range Status   08/25/2017 5.5 4.5 - 8.0 Final   11/28/2016 5.5 4.5 - 8.0 Final   03/27/2016 5.5 4.5 - 8.0 Final    and Acute Labs   C Reactive Protein    CRP   Date Value Ref Range Status   08/25/2017 <0.1 0.0 - 0.8 mg/dL Final   11/28/2016 1.2 (H)  0.0 - 0.8 mg/dL Final   03/27/2016 <0.1 0.0 - 0.8 mg/dL Final    and Renal Panel  [facility]  Creatinine   Date Value Ref Range Status   08/25/2017 0.87 0.70 - 1.30 mg/dL Final   11/30/2016 0.89 0.70 - 1.30 mg/dL Final   11/30/2016 0.88 0.70 - 1.30 mg/dL Final     Potassium   Date Value Ref Range Status   08/25/2017 3.4 (L) 3.5 - 5.0 mmol/L Final   11/30/2016 4.0 3.5 - 5.0 mmol/L Final   11/28/2016 4.4 3.5 - 5.0 mmol/L Final     Calcium   Date Value Ref Range Status   08/25/2017 9.6 8.5 - 10.5 mg/dL Final   11/30/2016 9.5 8.5 - 10.5 mg/dL Final   11/30/2016 9.5 8.5 - 10.5 mg/dL Final

## 2021-06-16 PROBLEM — R74.8 ELEVATED LIVER ENZYMES: Status: ACTIVE | Noted: 2018-03-03

## 2021-06-16 PROBLEM — K56.609 SBO (SMALL BOWEL OBSTRUCTION) (H): Status: ACTIVE | Noted: 2020-02-21

## 2021-06-16 PROBLEM — R10.11 RIGHT UPPER QUADRANT ABDOMINAL PAIN: Status: ACTIVE | Noted: 2018-03-03

## 2021-06-16 PROBLEM — N23 RENAL COLIC ON LEFT SIDE: Status: ACTIVE | Noted: 2017-08-25

## 2021-06-16 PROBLEM — R89.4 POSITIVE TEST FOR HERPES SIMPLEX VIRUS (HSV) ANTIBODY: Status: ACTIVE | Noted: 2018-03-08

## 2021-06-16 NOTE — TELEPHONE ENCOUNTER
Telephone Encounter by Gayathri Rivera LPN at 1/24/2020  2:16 PM     Author: Gayathri Rivera LPN Service: -- Author Type: Licensed Nurse    Filed: 1/24/2020  2:19 PM Encounter Date: 1/24/2020 Status: Signed    : Gayathri Rivera LPN (Licensed Nurse)       Patient Returning Call  Reason for call:  Message from clinic  Information relayed to patient:  Florence Cazares MA           1/24/20 1:52 PM   Note      Left message to call back for: Refill  Information to relay to patient:  MD message below. Please ask pt if he ever had neuro psych eval and if so where so we can get records           Patient has additional questions:  Yes  If YES, what are your questions/concerns:  Patient has not had a neuro psych evaluation.  Does he need this as well as an office visit with Dr. Moss?  He can schedule on.  Could he get medication to bridge him to appointments?  Okay to leave a detailed message?: Yes

## 2021-06-16 NOTE — PROGRESS NOTES
Assessment:     1. Transaminitis  Hepatic Profile    HM1(CBC and Differential)    HM1 (CBC with Diff)    HSC 1 & 2 ( IG M)  and HSV PCR - Misc. Lab Test    Manual Differential    HSV - Misc. Lab Test    [facility] Misc Test    CANCELED: Herpes Simplex PCR (not CSF)    CANCELED: Herpes simplex Virus (Type 1 and 2) IgG Antibody    CANCELED: [facility] Misc Test   2. Elevated liver enzymes  HSV - Misc. Lab Test   3. Positive test for herpes simplex virus (HSV) antibody  HSC 1 & 2 ( IG M)  and HSV PCR - Misc. Lab Test    HSV - Misc. Lab Test    [facility] Misc Test    Herpes simplex Virus (Type 1 and 2) IgG Antibody       Transaminitis, likely secondary to a viral etiology.  He appears to be clinically improving.    As discussed in HPI, the HSV IgM is very nonspecific and this was discussed with the patient.  Further testing is ordered.  I called the  infectious disease specialist, Dr Mckeon, who was kind enough to suggest further checking for IgG for possible future reference.  These were then included as an add-on lab.    HSV hepatitis/aseptic meningitis may have contributed to his overall illness.  Other viral syndromes are all possibilities.  If liver enzymes continue to see high I will refer patient to GI.  If any of the HSV labs come back positive, he will benefit from seeing ID specialist as he was concerned.  He reports no past history of herpes or genital store sores or labial lesions.  No new contacts.    Plan:      The diagnosis was discussed with the patient and evaluation and treatment plans outlined.  See orders for lab and imaging studies.  Further follow-up plans will be based on outcome of lab/imaging studies; see orders.     Subjective:      Adam Deras is a  male who presents for evaluation of   Chief Complaint   Patient presents with     Follow-up     hospital follow up elevated liver enzymes, would like further explaination/testing of hsv 1 and 2 antibodies.      Patient is here for follow-up of recent  hospitalization.  He was seen at this clinic on 03/2/2017 for abdominal pain, headaches and general malaise . His initial workup had shown moderately increased transaminases and he was admitted to hospital for further workup.  His liver enzymes continue to rise and then showed a downtrending pattern where he was discharged to follow-up.    During his workup in the hospital IgM antibody for HSV was positive.  This needs further workup.  I reviewed the discussions between the hospitalist, GI and ID regarding this lab as follows-    Patient was admitted for evaluation of significant transaminitis associated with mild abdominal pain and some viral prodrome. GI saw the patient and sent some tests for viral causes of hepatitis. HSV IgM is mildly positive. Discussed with GI Dr Urrutia recommended discussing with ID. I discussed with ID, Dr Coffey stated this nonspecific HSV test (which does not indicate if HSV 1 or 2) is not very useful and would like the following blood tests to further work this up: HSV 1 IgM, HSV 2 IgM, and HSV PCR. Dr Coffey did not recommend treatment with acyclovir at this time.      Patient feels he is recovering somewhat.  He had a bad headache 2 days ago.  He has not had a headache since then.  His appetite is somewhat improved.  Today for the first day he has been able to return to work.  He denies any nausea or vomiting.  He believes with his headache he was having a sense of loss of smell as he is now able to smell.    No Known Allergies    Current Outpatient Prescriptions on File Prior to Visit   Medication Sig Dispense Refill     bismuth subsalicylate (PEPTO BISMOL) 262 mg/15 mL suspension Take 15-30 mL by mouth every 4 (four) hours as needed for indigestion.       calcium, as carbonate, (TUMS) 200 mg calcium (500 mg) chewable tablet Chew 1-2 tablets 4 (four) times a day as needed for heartburn.       omeprazole (PRILOSEC) 20 MG capsule Take 1 capsule (20 mg total) by mouth daily. 30 capsule 1      ondansetron (ZOFRAN, AS HYDROCHLORIDE,) 4 MG tablet Take 1 tablet (4 mg total) by mouth every 8 (eight) hours as needed for nausea. 20 tablet 0     oxyCODONE (ROXICODONE) 5 MG immediate release tablet Take 1 tablet (5 mg total) by mouth every 4 (four) hours as needed for pain. 16 tablet 0     psyllium (METAMUCIL) 3.4 gram packet Take 1 packet by mouth daily.       No current facility-administered medications on file prior to visit.        Patient Active Problem List   Diagnosis     History of ulcerative colitis     History of total colectomy     Anemia     Nephrolithiasis     Leg weakness, bilateral     Renal colic on left side     Elevated liver enzymes     Right upper quadrant abdominal pain     Positive test for herpes simplex virus (HSV) antibody       Past Medical History:   Diagnosis Date     Anemia      Kidney stone     3/2016     Nephrolithiasis      Ulcerative colitis 2001    History of        Past Surgical History:   Procedure Laterality Date     TOTAL COLECTOMY         Family History   Problem Relation Age of Onset     Dementia Paternal Uncle      Breast cancer Maternal Grandmother      Rheum arthritis Mother        Social History     Social History     Marital status:      Spouse name: N/A     Number of children: N/A     Years of education: N/A     Occupational History     not employed       Social History Main Topics     Smoking status: Never Smoker     Smokeless tobacco: Never Used     Alcohol use Yes      Comment: 4 pints a week     Drug use: No     Sexual activity: Not Asked     Other Topics Concern     None     Social History Narrative    Works as a .    He is  and lives with his wife and twins who are 1-year-old.            Maggi Moss MD  3/7/2018               Review of Systems  Constitutional: negative for chills, fevers and Malaise is still present but is improving  Ears, nose, mouth, throat, and face: negative, He describes himself as a nose    Respiratory: negative  Cardiovascular: negative  Gastrointestinal: negative for melena, vomiting and BM are now back to baseline, patient is status post colectomy  Genitourinary:negative, He denies any genital lesions or concerns  Integument/breast: negative, Denies any sores, new rashes  Hematologic/lymphatic: negative  Musculoskeletal:negative  Neurological: negative for dizziness, gait problems, paresthesia, speech problems and Headaches are now resolving, .       Objective:     /72 (Patient Site: Left Arm, Patient Position: Sitting, Cuff Size: Adult Regular)  Pulse 70  Resp 14  Wt 186 lb 7 oz (84.6 kg)  BMI 26.75 kg/m2  GENERAL APPEARANCE:  Appearing stated age, smiling, alert, cooperative, and in no acute distress.   HEENT: Pupils equal, regular, react to light and accommodation. Extraocular muscles intact, fundi benign. Ear canals and tympanic membranes are normal. Lips, mouth, and throat are unremarkable.   NECK: Neck supple without adenopathy, thyromegaly or masses.   LYMPH: No anterior cervical or supraclavicular LN enlargement   PULMONARY: Normal respiratory effort. Chest is clear.   CARDIOVASCULAR: Heart auscultation: rhythm regular, heart sounds normal S1 and S2, .   SKIN: Warm and well perfused..   ABDOMEN: Abdomen soft, non-tender. BS normal. No masses or organomegaly.  Surgical scar marks are noted.   MUSCULOSKELETAL: No obvious joint swelling, deformity or limitation in range of motion, full range of motion of the back and neck without pain, strength normal and symmetric in all muscle groups.   EXTREMITIES: Peripheral pulses are full. Extremities with no edema.   MENTAL STATUS: Alert, oriented and thought content appropriate   NEUROLOGIC: Station and gait normal, strength and movement normal, reflexes are normal and symmetric

## 2021-06-16 NOTE — PROGRESS NOTES
Assessment/Plan:        Diagnoses and all orders for this visit:    Urinary tract stones  -     Urinalysis Macroscopic  -     Stone Formation, 24 Hour Urine x1; Future; Expected date: 6/14/18  -     Renal Function Profile; Future; Expected date: 6/14/18  -     Patient Stated Goal: Prevent further stones  -     24 Hour Urine Collection Steps Education  -     Patient Increasing Fluids Education    Urinary calculus  -     Stone Formation, 24 Hour Urine x1; Future; Expected date: 6/14/18  -     Renal Function Profile; Future; Expected date: 6/14/18  -     Patient Stated Goal: Prevent further stones  -     24 Hour Urine Collection Steps Education  -     Patient Increasing Fluids Education    Low urine output  -     Stone Formation, 24 Hour Urine x1; Future; Expected date: 6/14/18  -     Renal Function Profile; Future; Expected date: 6/14/18  -     Patient Stated Goal: Prevent further stones  -     24 Hour Urine Collection Steps Education  -     Patient Increasing Fluids Education      Stone Management Plan  [facility] Stone Management 11/30/2016 9/8/2017 3/16/2018   Urinary Tract Infection No suspicion of infection No suspicion of infection No suspicion of infection   Renal Colic Asymptomatic at this time Well controlled symptoms Asymptomatic at this time   Renal Failure No suspicion of renal failure No suspicion of renal failure No suspicion of renal failure   Current CT date 11/28/2016 9/8/2017 -   Right sided stones? Yes - -   R Number of ureteral stones 1 - -   R GSD of ureteral stones 4 - -   R Location of ureteral stone Distal - -   R Number of kidney stones  2 - -   R GSD of kidney stones 2 - 4 - -   R Hydronephrosis Moderate - -   R Stone Event New stone passed prior to visit - No current event   Diagnosis date 11/28/2016 - -   R Current Plan Observe - -   Observe rationale Limited stone burden with good prognosis for spontaneous passage - -   Left sided stones? Yes - -   L Number of ureteral stones No ureteral stones -  -   L Number of kidney stones  1 - -   L GSD of kidney stones 4 - 10 - -   L Hydronephrosis None - -   L Stone Event No current event - No current event   L Current Plan Observe - -   Observe rationale Limited stone burden with good prognosis for spontaneous passage - -             Subjective:      HPI  Mr. Adam Deras is a 47 y.o.  male returning to the [facility] [place] for follow-up 24 urine stone studies with history of having passed a stone not recovered with known bilateral small renal stones 2 on the right each 3 mm and 2 on the left 1 to millimeter and another one 4 mm.  Patient had serum studies done in September 2017 with a normal PTH and ionized calcium and serum calcium.  He comes at this time for review of 24 urine stone studies.    He is having no symptoms of urgency frequency abdominal or flank pain.  He recently had some transient elevation of his liver enzymes which is resolved being followed by his primary care physician.      Patient unable to give urine today.     Review of 24 urine stone studies demonstrated an extremely low volume of 275 cc and 1150 cc.  Remainder of studies were normal where they were able to do an assay however sodium was 0 citrate 80 and 108 magnesium barely detectable.    Patient had ulcerative colitis that was severe and had a total colectomy and has a J-pouch.  As a result he limits his fluid intake to a certain degree because of the amount of fluid going into the J-pouch.  He states he could drink more.    Impression bilateral renal stones nonobstructing small, history of J-pouch post colectomy for ulcerative colitis with extremely low urinary volume    Plan attempt increasing fluids to a point that he still is comfortable with his J-pouch and repeat a 24 hour urine.  We will follow-up in 3 months with 24 hour urine.  Patient will bring in pain meds that he has available to him for review and we will set him up with a kit for passage of stone  management meds.  CT will be updated in September 2018.         ROS   Review of systems is negative except for HPI.    Past Medical History:   Diagnosis Date     Anemia      Kidney stone     3/2016     Nephrolithiasis      Ulcerative colitis 2001    History of        Past Surgical History:   Procedure Laterality Date     TOTAL COLECTOMY         Current Outpatient Prescriptions   Medication Sig Dispense Refill     omeprazole (PRILOSEC) 20 MG capsule Take 1 capsule (20 mg total) by mouth daily. 30 capsule 1     No current facility-administered medications for this visit.        No Known Allergies    Social History     Social History     Marital status:      Spouse name: N/A     Number of children: N/A     Years of education: N/A     Occupational History     not employed       Social History Main Topics     Smoking status: Never Smoker     Smokeless tobacco: Never Used     Alcohol use Yes      Comment: 4 pints a week     Drug use: No     Sexual activity: Not on file     Other Topics Concern     Not on file     Social History Narrative    Works as a .    He is  and lives with his wife and twins who are 1-year-old.            Maggi Moss MD  3/7/2018               Family History   Problem Relation Age of Onset     Dementia Paternal Uncle      Breast cancer Maternal Grandmother      Rheum arthritis Mother      Objective:      Physical Exam  Vitals:    03/16/18 0823   BP: 121/75   Pulse: 89   Temp: 98.5  F (36.9  C)     General - well developed, well nourished, appropriate for age. Appears no distress at this time  Abdomen - slender soft, non-tender, no hepatosplenomegaly, no masses.   - no flank tenderness, no suprapubic tenderness, kidney and bladder non-palpable  MSK - normal spinal curvature. no spinal tenderness. normal gait. muscular strength intact.  Psych - oriented to time, place, and person, normal mood and affect.      Labs   Stone prevention labs   Serum chemistries  "  Creatinine   Date Value Ref Range Status   03/04/2018 0.79 0.70 - 1.30 mg/dL Final   03/03/2018 0.69 (L) 0.70 - 1.30 mg/dL Final   03/02/2018 0.76 0.70 - 1.30 mg/dL Final     Potassium   Date Value Ref Range Status   03/04/2018 4.2 3.5 - 5.0 mmol/L Final   03/03/2018 3.9 3.5 - 5.0 mmol/L Final   03/02/2018 4.2 3.5 - 5.0 mmol/L Final     Calcium   Date Value Ref Range Status   03/04/2018 8.4 (L) 8.5 - 10.5 mg/dL Final   03/03/2018 8.3 (L) 8.5 - 10.5 mg/dL Final   03/02/2018 8.8 8.5 - 10.5 mg/dL Final     Phosphorus   Date Value Ref Range Status   11/30/2016 2.8 2.5 - 4.5 mg/dL Final   11/30/2016 2.8 2.5 - 4.5 mg/dL Final     Uric Acid   Date Value Ref Range Status   11/30/2016 4.9 3.0 - 8.0 mg/dL Final   , Calcium metabolism   Calcium, Ionized Measured   Date Value Ref Range Status   11/30/2016 1.20 1.11 - 1.30 mmol/L Final      PTH   Date Value Ref Range Status   11/30/2016 44 10 - 86 pg/mL Final     No results found for: [id number]  and 24 hour urine   Calcium, 24H Urine   Date Value Ref Range Status   12/11/2017 13 (L) 25 - 300 mg/24hr Final     Comment:       Hypercalciuria >350  Values are for persons with  average daily calcium intake  (600-800 mg/day)   12/04/2017 52 25 - 300 mg/24hr Final     Comment:       Hypercalciuria >350  Values are for persons with  average daily calcium intake  (600-800 mg/day)     Sodium, 24 Hour Urine   Date Value Ref Range Status   12/11/2017  40 - 217 mmol/24hr Final     Comment:     \"Unable to calculate:  Urine Sodium value below detectable level\"     12/04/2017  40 - 217 mmol/24hr Final     Comment:     \"Unable to calculate:  Urine Sodium value below detectable level\"       Citrate, 24 Hour Urine   Date Value Ref Range Status   12/11/2017 80 (L) 328 - 1191 mg/24hr Final     Comment:       Reference Ranges are not  established for 0-19 years  or >60 years of age.   12/04/2017 108 (L) 328 - 1191 mg/24hr Final     Comment:       Reference Ranges are not  established for 0-19 " years  or >60 years of age.     Oxalate, 24 Hour Urine   Date Value Ref Range Status   12/11/2017 11.0 7.0 - 44.0 mg/24hr Final   12/04/2017 19.8 7.0 - 44.0 mg/24hr Final     pH, Urine   Date Value Ref Range Status   12/11/2017 5.5 4.5 - 8.0 Final   12/04/2017 5.0 4.5 - 8.0 Final     Urine Volume   Date Value Ref Range Status   12/11/2017 275 mL Final   12/04/2017 1150 mL Final

## 2021-06-16 NOTE — PROGRESS NOTES
"Assessment / Impression     1. Gastroesophageal reflux disease, esophagitis presence not specified     2. Gastroenteritis     3. Acute nonintractable headache, unspecified headache type           Plan:     Given acute onset of symptoms, suspect there may be viral gastroenteritis component, and given the significant amount of caffeine he drinks, may also have a component of GERD.  Discussed decreasing caffeine intake, and I would also recommend trial of omeprazole 20 mg daily.  I did also give him a prescription for Zofran, if he does start noticing any nausea or vomiting, may take medication as needed.  Discussed adequate fluid intake, anticipated time course of symptoms, and if symptoms are still ongoing for more than 7-10 days, return to clinic for reevaluation, sooner if he develops fever or symptoms worsen.  We did discuss doing lab work, the patient felt that symptoms are mild at this point, so we will go ahead and hold off.  Headache may be related to hydration as well, may take ibuprofen as needed, though recommend he take medication with food.    Follow-up with PCP or myself in 1-2 weeks if symptoms persist.      Subjective:      HPI: Adam Deras is a 47 y.o. male, new to me, who presents for \"sour stomach and headaches\".  Symptoms started on February 23, when patient said he had a Pap smear at work.  The following morning he woke up, feeling warm, and stated he had a \"sour stomach  with increased gurgling\".  Eating seemed to make symptoms worse.  He usually drinks a significant amount of caffeine, though did not cut back to only 1 caffeinated drink a day because it seemed to make his symptoms worse.  His stools have been normal.  No black tarry stools.  No bloody stools.  He also noted some headache.  She did take Advil, which helped his headache.  He has also been taking Pepto-Bismol, which has helped his abdominal symptoms, which are mostly centralized.  No nausea or vomiting.  No diarrhea.  No recent " "travel other than going to [place] this past weekend.      Medical History:     Patient Active Problem List   Diagnosis     History of ulcerative colitis     History of total colectomy     Anemia     Nephrolithiasis     Leg weakness, bilateral     Renal colic on left side       Past Medical History:   Diagnosis Date     Anemia      Kidney stone     3/2016     Nephrolithiasis      Ulcerative colitis 2001    History of        Past Surgical History:   Procedure Laterality Date     TOTAL COLECTOMY         Current Medications:     Current Outpatient Prescriptions   Medication Sig     omeprazole (PRILOSEC) 20 MG capsule Take 1 capsule (20 mg total) by mouth daily.     ondansetron (ZOFRAN, AS HYDROCHLORIDE,) 4 MG tablet Take 1 tablet (4 mg total) by mouth every 8 (eight) hours as needed for nausea.       Family History:     Family History   Problem Relation Age of Onset     Dementia Paternal Uncle      Breast cancer Maternal Grandmother      Rheum arthritis Mother        Review of Systems  All other systems reviewed and are negative.         Social History:     History   Smoking Status     Never Smoker   Smokeless Tobacco     Never Used     Social History     Social History Narrative    Works as a          Objective:     /84 (Patient Site: Right Arm, Patient Position: Sitting, Cuff Size: Adult Regular)  Pulse 76  Temp 98.6  F (37  C) (Oral)   Resp 16  Ht 5' 10\" (1.778 m)  Wt 187 lb 6.4 oz (85 kg)  BMI 26.89 kg/m2  Physical Examination: General appearance - alert, well appearing, and in no distress  Eyes: pupils equal and reactive, extraocular eye movements intact  Mouth: mucous membranes moist, pharynx normal without lesions  Neck: supple, no significant adenopathy or thyromegaly  Lungs: clear to auscultation, no wheezes, rales or rhonchi, symmetric air entry  Heart: normal rate, regular rhythm, normal S1, S2, no murmurs.  Abdomen: soft, nontender, nondistended, no masses or organomegaly.  No " rebound or guarding.  Well-healed abdominal scars.    Neurological: alert, oriented, normal speech, no focal findings or movement disorder noted.    Extremities: No edema, no clubbing or cyanosis  Psychiatric: Normal affect. Does not appear anxious or depressed.    No results found for this or any previous visit (from the past 168 hour(s)).      Dorinda Anderson MD  2/27/2018  4:13 PM

## 2021-06-18 NOTE — PATIENT INSTRUCTIONS - HE
Patient Instructions by Maggi Moss MD at 2/25/2020  1:30 PM     Author: Maggi Moss MD Service: -- Author Type: Physician    Filed: 2/25/2020  2:17 PM Encounter Date: 2/25/2020 Status: Addendum    : Maggi Moss MD (Physician)    Related Notes: Original Note by Maggi Moss MD (Physician) filed at 2/25/2020  2:11 PM       Follow with psych as scheduled    Labs per your request    Follow if symptoms       Patient Education     Understanding Seborrheic Dermatitis    Seborrheic dermatitis is a common type of rash that causes red, scaly, greasy skin. It occurs on skin that has oil glands. These include the face, upper chest, and scalp, where it is often called dandruff. It tends to last a long time, or go away and come back. Seborrheic dermatitis is not spread from person to person.   How to say it  [id number]  What causes seborrheic dermatitis?  Experts don't know what causes it. It may be partly caused by a type of yeast that grows on skin, along with extra oil production. Experts are still learning more. Its more common in men than women, and it can occur at any age. It happens more often in people with HIV/AIDS, Parkinson disease, alcoholic pancreatitis, hepatitis, or cancer. It can also get worse during times of stress.  Symptoms of seborrheic dermatitis  Symptoms can include skin that is:    Bumpy    Covered with yellow scales or crusts    Cracked    Greasy    Itchy    Leaking fluid    Painful    Red or orange  These symptoms can occur on skin:    Around the nose    Behind the ears    In the beard    In the eyebrows    On the scalp, also known as dandruff    On the upper chest  You may also have acne, inflamed eyelids (blepharitis), or other skin conditions at the same time.  Treatment for seborrheic dermatitis  Treatment can reduce symptoms for a period of time. The types of treatments most often used include:    Antifungal shampoo, body wash, or cream. These  contain medicines such as ketoconazole, fluconazole, selenium sulfide, ciclopirox, pyrithione zinc, or tea tree oil.    Corticosteroid cream or ointment. These contain medicines such as hydrocortisone.    Calcineurin inhibitor cream or ointment. These contain medicines such as pimecrolimus or tacrolimus.    Shampoo or cream with other medicines. These contain medicines such as coal tar, salicylic acid, or zinc pyrithione.    Sodium sulfacetemide creams and washes. These may also help.  In some cases one treatment will stop working after a while. Switching between treatments every few months may be helpful.   Wash your skin gently. You can remove scales with oil and gentle rubbing or a brush.  Living with seborrheic dermatitis  Seborrheic dermatitis is an ongoing (chronic) condition. It can go away and then come back. You will likely need to use shampoo, cream, or ointment with medicine once or twice a week. This can help to keep symptoms from coming back or getting worse.  When to call your healthcare provider  Call your healthcare provider right away if you have any of these:    Symptoms that dont get better, or get worse    New symptoms  Date Last Reviewed: 5/1/2016 2000-2019 [employer]. [address] [place] [address], [place] [address]. All rights reserved. This information is not intended as a substitute for professional medical care. Always follow your healthcare professional's instructions.

## 2021-06-19 NOTE — LETTER
Letter by Maggi Moss MD at      Author: Maggi Moss MD Service: -- Author Type: --    Filed:  Encounter Date: 5/6/2019 Status: (Other)         [facility] MEDICINE/OB  05/06/19    Patient: Adam Deras  YOB: 1970  Medical Record Number: [id number]  CSN: [id number]                                                                              Non-opioid Controlled Substance Agreement    I understand that my care provider has prescribed a controlled substance to help manage my condition(s). I am taking this medicine to help me function or work. I know this is strong medicine, and that it can cause serious side effects. Controlled substances can be sedating, addicting and may cause a dependency on the drug. They can affect my ability to drive or think, and cause depression. They need to be taken exactly as prescribed. Combining controlled substances with certain medicines or chemicals (such as cocaine, sedatives and tranquilizers, sleeping pills, meth) can be dangerous or even fatal. Also, if I stop controlled substances suddenly, I may have severe withdrawal symptoms.  If not helpful, I may be asked to stop them.    The risks, benefits, and side effects of these medicine(s) were explained to me. I agree that:    1. I will take part in other treatments as advised by my care team. This may be psychiatry or counseling, physical therapy, behavioral therapy, group treatment or a referral to a pain clinic. I will reduce or stop my medicine when my care team tells me to do so.  2. I will take my medicines as prescribed. I will not change the dose or schedule unless my care team tells me to. There will be no refills if I run out early.  I may be contactedwithout warning and asked to complete a urine drug test or pill count at any time.   3. I will keep all my appointments, and understand this is part of the monitoring of controlled substances. My care team may require an office visit for  EVERY controlled substance refill. If I miss appointments or dont follow instructions, my care team may stop my medicine.  4. I will not ask other providers to prescribe controlled substances, and I will not accept controlled substances from other people. If I need another prescribed controlled substance for a new reason, I will tell my care team within 1 business day.  5. I will use one pharmacy to fill all of my controlled substance prescriptions, and it is up to me to make sure that I do not run out of my medicines on weekends or holidays. If my care team is willing to refill my controlled substance prescription without a visit, I must request refills only during office hours, refills may take up to 3 days to process, and it may take up to 5 to 7 days for my medicine to be mailed and ready at my pharmacy. Prescriptions will not be mailed anywhere except my pharmacy.    6. I am responsible for my prescriptions. If the medicine/prescription is lost or stolen, it will not be replaced. I also agree not to share controlled substance medicines with anyone.          [facility] MEDICINE/OB  05/06/19  Patient:  Adam Deras  YOB: 1970  Medical Record Number: [id number]  CSN: [id number]    7. I agree to not use ANY illegal or recreational drugs. This includes marijuana, cocaine, bath salts or other drugs. I agree not to use alcohol unless my care team says I may. I agree to give urine samples whenever asked. If I dont give a urine sample, the care team may stop my medicine.    8. If I enroll in the [place] Medical Marijuana program, I will tell my care team. I will also sign an agreement to share my medical records with my care team.    9. I will bring in my list of medicines (or my medicine bottles) each time I come to the clinic.   10. I will tell my care team right away if I become pregnant or have a new medical problem treated outside of my regular clinic.  11. I understand that this medicine  can affect my thinking and judgment. It may be unsafe for me to drive, use machinery and do dangerous tasks. I will not do any of these things until I know how the medicine affects me. If my dose changes, I will wait to see how it affects me. I will contact my care team if I have concerns about medicine side effects.    I understand that if I do not follow any of the conditions above, my prescriptions or treatment may be stopped.      I agree that my provider, clinic care team, and pharmacy may work with any city, state or federal law enforcement agency that investigates the misuse, sale, or other diversion of my controlled medicine. I will allow my provider to discuss my care with or share a copy of this agreement with any other treating provider, pharmacy or emergency room where I receive care. I agree to give up (waive) any right of privacy or confidentiality with respect to these consents.   I have read this agreement and have asked questions about anything I did not understand.    ___________________________________________________________________________  Patient signature - Date/Time  -Adam Deras                                      ___________________________________________________________________________  Witness signature                                                                    ___________________________________________________________________________  Provider signature- Maggi Moss MD

## 2021-06-20 NOTE — LETTER
Letter by Maggi Moss MD at      Author: Maggi Moss MD Service: -- Author Type: --    Filed:  Encounter Date: 2020 Status: (Other)         Adam Deras   [facility] [id number]      2020      Dear Adam Deras,   : 1970      This letter is in regards to the appointment that you had scheduled on 20 at the [facility] with Dr. Moss.     The [facility] strives to see all patients in a timely manner and we need your help to achieve this.  The above-mentioned appointment was missed and we do not have record of a cancellation by you.  Whenever possible, we request appointment cancellations at least 72 hours in advance.  This time allows us to offer the appointment to another patient in need.      If you feel you have received this letter in error, or if you need to reschedule this appointment, please call our office so that we may update our records.      Sincerely,    [facility]

## 2021-06-23 NOTE — PROGRESS NOTES
"Assessment:      Acute pharyngitis, likely   Strep throat.      Plan:     Reviewed positive rapid strep test result with patient.    Patient placed on antibiotics.  Use of OTC analgesics recommended as well as salt water gargles.  Patient advised that he will be infectious for 24 hours after starting antibiotics.  Follow up as needed.     Subjective:       Adam Deras is a 48 y.o. male who presents for evaluation of sore throat. Associated symptoms include nasal blockage, sore throat and bilateral ear pain. Onset of symptoms was 2 days ago, and have been unchanged since that time. He is drinking moderate amounts of fluids. He has not had a recent close exposure to someone with proven streptococcal pharyngitis.    The following portions of the patient's history were reviewed and updated as appropriate: allergies, current medications, past medical history, past social history and problem list.    Review of Systems  Pertinent items are noted in HPI.  A 12 point comprehensive review of systems was negative except as noted.      Objective:      /66 (Patient Site: Left Arm, Patient Position: Sitting, Cuff Size: Adult Large)   Pulse 96   Temp 99.2  F (37.3  C) (Oral)   Ht 5' 9.25\" (1.759 m)   Wt 184 lb 8 oz (83.7 kg)   BMI 27.05 kg/m    General appearance: alert, appears stated age and cooperative  Head: Normocephalic, without obvious abnormality, atraumatic  Eyes: conjunctivae/corneas clear. PERRL, EOM's intact. Fundi benign.  Ears: normal TM's and external ear canals both ears  Throat: abnormal findings: moderate oropharyngeal erythema  Neck: mild anterior cervical adenopathy  Lungs: clear to auscultation bilaterally  Heart: regular rate and rhythm, S1, S2 normal, no murmur, click, rub or gallop    Laboratory  Strep test done. Results:positive.         "

## 2021-06-25 NOTE — PROGRESS NOTES
Progress Notes by Josh Coyne MD at 7/11/2017 11:20 AM     Author: Josh Coyne MD Service: -- Author Type: Physician    Filed: 7/11/2017 11:39 PM Encounter Date: 7/11/2017 Status: Signed    : Josh Coyne MD (Physician)              [facility]    Comprehensive and complex medical care - Chronic disease management - Shared decision making - Care coordination - Compassionate care    Patient advocacy - Rational deprescribing - Minimally disruptive medicine - Ethical focus - Customized care    Date of Service: 7/11/2017  Primary Provider: Steve Millan DO    Patient Care Team:  Steve Millan DO as PCP - General (Family Medicine)     ______________________________________________________________________     Patient's Pharmacy:    [id number] - [place], [place] - [address] AT [facility] [address]  [address]  [place] [address]  Phone: [phone] Fax: [phone]     Patient's Insurance:    Payor: MEDICA / Plan: MEDICA / Product Type: PPO/POS/FFS /     ______________________________________________________________________     Adam Deras is 46 y.o. male who comes in today for:  Chief Complaint   Patient presents with   ? Illness     since Friday night, came out of nowhere, sore throat, headache  (not as much anymore), spots on hands, congestion, fatigue, gets cough when does things      ______________________________________________________________________     Patient Active Problem List   Diagnosis   ? History of ulcerative colitis   ? History of total colectomy   ? Anemia   ? Nephrolithiasis   ? Leg weakness, bilateral      Current Outpatient Prescriptions   Medication Sig   ? ferrous sulfate 325 (65 FE) MG tablet Take 1 tablet by mouth 2 (two) times a day.   ? HYDROcodone-acetaminophen 5-325 mg per tablet Take 1 tablet by mouth every 6 (six) hours as needed for pain.   ?  tamsulosin (FLOMAX) 0.4 mg Cp24 Take 1 capsule (0.4 mg total) by mouth daily.      ______________________________________________________________________       History of present illness:    The patient is a new patient to internal medicine or has not been seen by an internist in [facility] for over 3 years.      The patient comes in today for feeling poorly overall.  He's been feeling sick since last Friday.  He has a twin son and daughter who are 6 months old.  One daughter had hand-foot mouth disease about 2 weeks ago.  His son had pinkeye.    He is .  He is originally from [place].  He does travel to his in-laws cabin in [place].    He's been initially having a sore throat and felt wiped out on Friday.  He's been really feeling sick and mostly in bed.  He works in IT.  He has not had much of an appetite and has had a little bit of a headache which is subsequently improved.  He has suddenly developed lesions over his palms.  He has had a some congestion and cough with this.  He feels a little lightheaded.  Nothing has really helped with this although he is a little bit better with time.  He denies any fevers or chills at this time.  He just mostly not improving.    He was seen at an outside urgent care yesterday and had a strep test was read as negative.    He has a history of ulcerative colitis with total colectomy and is doing okay in relationship to this.    On review of systems, the patient denies any chest pain or shortness of breath.  ______________________________________________________________________     Exam:    /78  Pulse 84  Temp 98.5  F (36.9  C) (Oral)   Wt 179 lb 12.8 oz (81.6 kg)  SpO2 98%  BMI 26.55 kg/m2  Patient is comfortable, no apparent distress.  Tired and slightly irritable.  Mood good.  Insight good.  Ears - clear.  Nose exam shows mild rhinitis.  Throat - mild erythema with no discrete lesions.  Neck is supple, there is no cervical adenopathy.  No thyromegaly.   Heart regular rate and rhythm.  Lungs are clear to auscultation bilaterally.  Respiratory effort is good.   His hands show scattered papular lesions which are consistent with adult coxsackie viral infection/hand-foot mouth disease.  There are no foot lesions.    ______________________________________________________________________     Assessment:    1. Coxsackie viral disease    2. Hand, foot and mouth disease       ______________________________________________________________________    No results found for: LDLCALC    PHQ-2 Total Score: 0 (7/11/2017 11:00 AM)  No Data Recorded      Plan:    1. Reviewed this with the patient and suspect symptomatic treatment is best.  2. Use over-the-counter pain medications as needed. Reviewed the infectious nature of the disease.    Josh Coyne MD  General Internal Medicine  [facility]    Return if symptoms worsen or fail to improve.

## 2021-06-26 ENCOUNTER — HEALTH MAINTENANCE LETTER (OUTPATIENT)
Age: 51
End: 2021-06-26

## 2021-06-26 NOTE — PROGRESS NOTES
Progress Notes by Maggi Moss MD at 3/2/2018 11:20 AM     Author: Maggi Moss MD Service: -- Author Type: Physician    Filed: 3/7/2018  1:37 PM Encounter Date: 3/2/2018 Status: Signed    : Maggi Moss MD (Physician)            Assessment:     1. Abdominal pain  HM1(CBC and Differential)    Comprehensive Metabolic Panel    Influenza A/B Rapid Test    Amylase    Lipase    aluminum-magnesium hydroxide-simethicone 200-200-20 mg/5 mL suspension 30 mL (MAALOX ADVANCED)    HM1 (CBC with Diff)    Manual Differential    Mononucleosis Screen   2. Headache  Comprehensive Metabolic Panel    Mononucleosis Screen   3. Cold and clammy skin  HM1(CBC and Differential)    Comprehensive Metabolic Panel    HM1 (CBC with Diff)    Manual Differential    Mononucleosis Screen        Abdominal pain, likely secondary to a viral illness, other causes like hepatic lesion/pancreatitis/i other infectious causes cannot be ruled out.    Maalox given in the clinic provided little relief.      At the time of documentation the lab values came back very high for AST/ ALT.  Patient then contacted the triage nurse and the doctor on call admitted the patient for further workup.  Patient has been discharged from hospital and I have made a courtesy phone call and he seems to be getting slightly better but his symptoms are not totally resolved and he is scheduled to see me for a follow-up on Thursday.     Plan:      Patient Instructions     I have seen you today for abdominal pain and headache.    The last clinical assessment points to a viral origin.    With history of moderate alcohol use recently I would advise you to continue taking acid reducing medication/omeprazole as prescribed before.    An influenza and mono test done in clinic today negative.      For abdominal pain and Headache I am going to order some Vicodin to provide symptom relief.    The hemogram ordered a CBC today showed increased lymphocyte (subset of WBC).   These are usually associated with viral infection.  There was some increase in another subset of basophil.  Since this is a machine reading, the sample has been sent to pathology to be manually evaluated.    Maggi Moss MD  3/2/2018        Abdominal Pain    Abdominal pain is pain in the stomach or belly area. Everyone has this pain from time to time. In many cases it goes away on its own. But abdominal pain can sometimes be due to a serious problem, such as appendicitis. So its important to know when to seek help.  Causes of abdominal pain  There are many possible causes of abdominal pain. Common causes in adults include:    Constipation, diarrhea, or gas    Stomach acid flowing back up into the esophagus (acid reflux or heartburn)    Severe acid reflux, called GERD (gastroesophageal reflux disease)    A sore in the lining of the stomach or small intestine (peptic ulcer)    Inflammation of the gallbladder, liver, or pancreas    Gallstones or kidney stones    Appendicitis     Intestinal blockage     An internal organ pushing through a muscle or other tissue (hernia)    Urinary tract infections    In women, menstrual cramps, fibroids, or endometriosis    Inflammation or infection of the intestines  Diagnosing the cause of abdominal pain  Your healthcare provider will do a physical exam help find the cause of your pain. If needed, tests will be ordered. Belly pain has many possible causes. So it can be hard to find the reason for your pain. Giving details about your pain can help. Tell your provider where and when you feel the pain, and what makes it better or worse. Also let your provider know if you have other symptoms such as:    Fever    Tiredness    Upset stomach (nausea)    Vomiting    Changes in bathroom habits  Treating abdominal pain  Some causes of pain need emergency medical treatment right away. These include appendicitis or a bowel blockage. Other problems can be treated with rest, fluids, or  medicines. Your healthcare provider can give you specific instructions for treatment or self-care based on what is causing your pain.  If you have vomiting or diarrhea, sip water or other clear fluids. When you are ready to eat solid foods again, start with small amounts of easy-to-digest, low-fat foods. These include apple sauce, toast, or crackers.   When to seek medical care  Call 911 or go to the hospital right away if you:    Cant pass stool and are vomiting    Are vomiting blood or have bloody diarrhea or black, tarry diarrhea    Have chest, neck, or shoulder pain    Feel like you might pass out    Have pain in your shoulder blades with nausea    Have sudden, severe belly pain    Have new, severe pain unlike any you have felt before    Have a belly that is rigid, hard, and tender to touch  Call your healthcare provider if you have:    Pain for more than 5 days    Bloating for more than 2 days    Diarrhea for more than 5 days    A fever of 100.4 F (38.0 C) or higher, or as directed by your provider    Pain that gets worse    Weight loss for no reason    Continued lack of appetite    Blood in your stool  How to prevent abdominal pain  Here are some tips to help prevent abdominal pain:    Eat smaller amounts of food at one time.    Avoid greasy, fried, or other high-fat foods.    Avoid foods that give you gas.    Exercise regularly.    Drink plenty of fluids.  To help prevent GERD symptoms:    Quit smoking.    Reduce alcohol and certain foods that increase stomach acid.    Avoid aspirin and over-the-counter pain and fever medicines (NSAIDS or nonsteroidal anti-inflammatory drugs), if possible    Lose extra weight.    Finish eating at least 2 hours before you go to bed or lie down.    Raise the head of your bed.  Date Last Reviewed: 7/1/2016 2000-2016 [employer]. [address] [place] [address], [place] [address]. All rights reserved. This information is not intended as a substitute for professional  medical care. Always follow your healthcare professional's instructions.                     The diagnosis was discussed with the patient and evaluation and treatment plans outlined.  See orders for lab and imaging studies.     Subjective:       Adam Deras is a 47 y.o. male who presents for evaluation of abdominal pain. Onset was 1 week ago. Symptoms have been unchanged. The pain is described as sharp, and is moderate/10 in intensity. Pain is located in the LUQ, epigastric region and periumbilical region without radiation.  Aggravating factors: eating.  Alleviating factors: none. Associated symptoms: anorexia and headache. The patient denies constipation, diarrhea, flatus, hematochezia and melena.    He also has a diffuse nagging headache that he is unable to get rid off. In general he feels sick.    Today's BM was more foul smelling tahn usual..        The following portions of the patient's history were reviewed and updated as appropriate: allergies, current medications, past family history, past medical history, past social history, past surgical history and problem list.    No Known Allergies    Current Outpatient Prescriptions on File Prior to Visit   Medication Sig Dispense Refill   ? omeprazole (PRILOSEC) 20 MG capsule Take 1 capsule (20 mg total) by mouth daily. 30 capsule 1   ? ondansetron (ZOFRAN, AS HYDROCHLORIDE,) 4 MG tablet Take 1 tablet (4 mg total) by mouth every 8 (eight) hours as needed for nausea. 20 tablet 0     No current facility-administered medications on file prior to visit.        Patient Active Problem List   Diagnosis   ? History of ulcerative colitis   ? History of total colectomy   ? Anemia   ? Nephrolithiasis   ? Leg weakness, bilateral   ? Renal colic on left side   ? Elevated liver enzymes   ? Right upper quadrant abdominal pain       Past Medical History:   Diagnosis Date   ? Anemia    ? Kidney stone     3/2016   ? Nephrolithiasis    ? Ulcerative colitis 2001    History of         Past Surgical History:   Procedure Laterality Date   ? TOTAL COLECTOMY         Family History   Problem Relation Age of Onset   ? Dementia Paternal Uncle    ? Breast cancer Maternal Grandmother    ? Rheum arthritis Mother        Social History     Social History   ? Marital status:      Spouse name: N/A   ? Number of children: N/A   ? Years of education: N/A     Occupational History   ? not employed       Social History Main Topics   ? Smoking status: Never Smoker   ? Smokeless tobacco: Never Used   ? Alcohol use Yes      Comment: 4 pints a week   ? Drug use: No   ? Sexual activity: Not Asked     Other Topics Concern   ? None     Social History Narrative    Works as a .    He is  and lives with his wife and twins who are 1-year-old.            Maggi Moss MD  3/7/2018               Review of Systems  Constitutional: negative  Respiratory: negative  Cardiovascular: negative  Integument/breast: negative  Hematologic/lymphatic: negative       Objective:     /70 (Patient Site: Right Arm, Patient Position: Sitting, Cuff Size: Adult Large)  Pulse 80  Temp 98.5  F (36.9  C) (Oral)   Resp 16  Wt 188 lb 3.2 oz (85.4 kg)  BMI 27 kg/m2    General:Healthy, alert and in no acute distress  Head:  NCAT w/o lesions or tenderness  Eyes: conjunctivae/corneas clear. PERRL, EOM's intact. Fundi benign  Ears: normal TM's and external ear canals bilateral  Nose: Enlarged and erythematous turbinates  Mouth: lips, mucosa, and tongue normal. Teeth and gums normal. No tonsillar endangerment , Mild erythema of pharynx  Neck: supple, symmetrical, trachea midline.  Lungs: clear to auscultation bilaterally  Heart: RRR, No murmurs  Abdomen: Soft ND, there is diffuse nonspecific tenderness especially in the upper abdomen, no HSM, No peritoneal signs, Rebound negative, Mc Jad's sign negative, No CVA tenderness.  Skin: No rashes, skin is clammy and moist to touch

## 2021-06-29 NOTE — TELEPHONE ENCOUNTER
Please ask covering provider to okay  refill     [General Appearance - Alert] : alert [General Appearance - In No Acute Distress] : in no acute distress [Cranial Nerves Optic (II)] : visual acuity intact bilaterally,  visual fields full to confrontation, pupils equal round and reactive to light [Cranial Nerves Oculomotor (III)] : extraocular motion intact [Cranial Nerves Trigeminal (V)] : facial sensation intact symmetrically [Cranial Nerves Vestibulocochlear (VIII)] : hearing was intact bilaterally [Cranial Nerves Facial (VII)] : face symmetrical [Cranial Nerves Glossopharyngeal (IX)] : tongue and palate midline [Cranial Nerves Accessory (XI - Cranial And Spinal)] : head turning and shoulder shrug symmetric [Cranial Nerves Hypoglossal (XII)] : there was no tongue deviation with protrusion [Motor Tone] : muscle tone was normal in all four extremities [Motor Strength] : muscle strength was normal in all four extremities [Sensation Tactile Decrease] : light touch was intact [Sclera] : the sclera and conjunctiva were normal [PERRL With Normal Accommodation] : pupils were equal in size, round, reactive to light, with normal accommodation [Extraocular Movements] : extraocular movements were intact [] : the neck was supple [Paresis Pronator Drift Right-Sided] : no pronator drift on the right [Paresis Pronator Drift Left-Sided] : no pronator drift on the left [Motor Strength Upper Extremities Bilaterally] : strength was normal in both upper extremities [Motor Strength Lower Extremities Bilaterally] : strength was normal in both lower extremities [FreeTextEntry6] : Weakness of l LE due to knee replacement

## 2021-10-16 ENCOUNTER — HEALTH MAINTENANCE LETTER (OUTPATIENT)
Age: 51
End: 2021-10-16

## 2022-07-17 ENCOUNTER — HEALTH MAINTENANCE LETTER (OUTPATIENT)
Age: 52
End: 2022-07-17

## 2022-08-09 ENCOUNTER — OFFICE VISIT (OUTPATIENT)
Dept: FAMILY MEDICINE | Facility: CLINIC | Age: 52
End: 2022-08-09
Payer: COMMERCIAL

## 2022-08-09 VITALS
SYSTOLIC BLOOD PRESSURE: 130 MMHG | BODY MASS INDEX: 25.43 KG/M2 | OXYGEN SATURATION: 99 % | HEART RATE: 58 BPM | DIASTOLIC BLOOD PRESSURE: 80 MMHG | WEIGHT: 177.25 LBS | RESPIRATION RATE: 16 BRPM | TEMPERATURE: 97.4 F

## 2022-08-09 DIAGNOSIS — Z00.00 PREVENTATIVE HEALTH CARE: ICD-10-CM

## 2022-08-09 DIAGNOSIS — Z63.79 STRESSFUL LIFE EVENTS AFFECTING FAMILY AND HOUSEHOLD: ICD-10-CM

## 2022-08-09 DIAGNOSIS — G47.9 SLEEP DISORDER: ICD-10-CM

## 2022-08-09 DIAGNOSIS — F43.22 ADJUSTMENT DISORDER WITH ANXIOUS MOOD: Primary | ICD-10-CM

## 2022-08-09 PROCEDURE — 99213 OFFICE O/P EST LOW 20 MIN: CPT | Performed by: FAMILY MEDICINE

## 2022-08-09 RX ORDER — TRAZODONE HYDROCHLORIDE 50 MG/1
50-100 TABLET, FILM COATED ORAL AT BEDTIME
Qty: 60 TABLET | Refills: 3 | Status: SHIPPED | OUTPATIENT
Start: 2022-08-09 | End: 2023-03-16

## 2022-08-09 NOTE — PROGRESS NOTES
"  Assessment & Plan     Adjustment disorder with anxious mood  I agree trazodone is a good option for him. I advised starting at 50mg an hour before bedtime x 2 weeks, then decide if a higher dose is needed. After 2 weeks he can also choose if he wants to skip a dose here and there. I advised on sleep hygiene, exercise and follow up for physical. He agreed to return but did not schedule a follow up appt.  - traZODone (DESYREL) 50 MG tablet  Dispense: 60 tablet; Refill: 3    Sleep disorder  As above  - traZODone (DESYREL) 50 MG tablet  Dispense: 60 tablet; Refill: 3    Preventative health care  reviewed  - REVIEW OF HEALTH MAINTENANCE PROTOCOL ORDERS    Stressful life events affecting family and household  Separation/divorce with kids involved.    Review of external notes as documented elsewhere in note  Prescription drug management  25 minutes spent on the date of the encounter doing chart review, history and exam, documentation and further activities per the note    Return in about 3 months (around 11/9/2022) for Follow up.    Leatha Paul MD  [facility] NANCY Ricks JD is a 51 year old, presenting for the following health issues:  sleep issues    History of Present Illness       Reason for visit:  Trouble sleeping  Symptom onset:  More than a month  Symptoms include:  Restless sleep  Symptom intensity:  Severe  Symptom progression:  Staying the same  Had these symptoms before:  No    He eats 2-3 servings of fruits and vegetables daily.He consumes 0 sweetened beverage(s) daily.He exercises with enough effort to increase his heart rate 30 to 60 minutes per day.  He exercises with enough effort to increase his heart rate 4 days per week.   He is taking medications regularly.     Getting a divorce. They are \"nesting\" to help the kids. This means the kids stay in the home and the parents alternate times when they are in the house with the kids.  Sleep is hard. Melatonin 3 pills " per night and he's still restless and waking often.  He's talked to people and heard trazodone is good, so he wants to try it.  He is exercising and seeing a therapist.  He has no pre-bedtime routine.    Review of Systems         Objective    /80   Pulse 58   Temp 97.4  F (36.3  C) (Temporal)   Resp 16   Wt 80.4 kg (177 lb 4 oz)   SpO2 99%   BMI 25.43 kg/m    Body mass index is 25.43 kg/m .  Physical Exam   GENERAL: alert and fatigued  MS: no gross musculoskeletal defects noted, no edema  PSYCH: mentation appears normal, affect flat, speech pressured, judgement and insight intact and appearance well groomed            .  ..

## 2022-08-26 ENCOUNTER — NURSE TRIAGE (OUTPATIENT)
Dept: NURSING | Facility: CLINIC | Age: 52
End: 2022-08-26

## 2022-08-26 NOTE — TELEPHONE ENCOUNTER
Patient is complaining of chest injury while working out 3 weeks ago  Fell on the chest where the breast bone is located  Rates pain 7/10 with movement, pain is sharp  Continues to exercise and run, and feels like it is not getting better  Triage guidelines recommend to see in office within 3 days  Caller verbalized and understands directives    Reason for Disposition    Injury and pain has not improved after 3 days    Additional Information    Negative: Major injury from dangerous force or speed (e.g., MVA, fall > 10 feet or 3 meters)    Negative: Bullet wound, knife wound or other penetrating object    Negative: Puncture wound that sounds life-threatening to the triager    Negative: SEVERE difficulty breathing (e.g., struggling for each breath, speaks in single words)    Negative: Major bleeding (actively dripping or spurting) that can't be stopped    Negative: Open wound of the chest with sound of moving air (sucking wound) or visible air bubbles    Negative: Shock suspected (e.g., cold/pale/clammy skin, too weak to stand, low BP, rapid pulse)    Negative: Coughing or spitting up blood    Negative: Bluish (or gray) lips or face    Negative: Unconscious or was unconscious    Negative: Sounds like a life-threatening emergency to the triager    Negative: Chest pain not from an injury    Negative: Wound looks infected    Negative: SEVERE chest pain    Negative: Difficulty breathing and not severe    Negative: Skin is split open or gaping    Negative: Bleeding won't stop after 10 minutes of direct pressure (using correct technique)    Negative: Sounds like a serious injury to the triager    Negative: Can't take a deep breath but no respiratory distress (e.g., hurts to take a deep breath)    Negative: Shallow puncture wound    Negative: Collarbone is painful and difficulty raising arm    Negative: Patient is confused or is an unreliable provider of information (e.g., dementia, severe intellectual disability, alcohol  intoxication)    Negative: No prior tetanus shots (or is not fully vaccinated) and any wound (e.g., cut or scrape)    Negative: HIV positive or severe immunodeficiency (severely weak immune system) and DIRTY cut or scrape    Negative: Large swelling or bruise and size > palm of person's hand    Negative: Last tetanus shot > 5 years ago and DIRTY cut or scrape    Negative: Last tetanus shot > 10 years ago and CLEAN cut or scrape    Negative: High-risk adult (e.g., age > 60 years, osteoporosis, chronic steroid use)    Negative: Suspicious history for the injury    Negative: Patient wants to be seen    Negative: Injury is still painful or swollen after 2 weeks    Protocols used: CHEST INJURY-A-OH

## 2022-08-29 ENCOUNTER — TELEPHONE (OUTPATIENT)
Dept: FAMILY MEDICINE | Facility: CLINIC | Age: 52
End: 2022-08-29

## 2022-08-29 NOTE — TELEPHONE ENCOUNTER
Reason for Call:  Appointment Request    Patient requesting this type of appt:  Office for sports chest injury/  Has an appt at another clinic but likes [facility] better.     Requested provider: anyone    Reason patient unable to be scheduled: Not within requested timeframe    When does patient want to be seen/preferred time: 1-2 days    Comments: no openings here until September 13th.       Call taken on 8/29/2022 at 12:51 PM by CISCO Grant

## 2022-09-25 ENCOUNTER — HEALTH MAINTENANCE LETTER (OUTPATIENT)
Age: 52
End: 2022-09-25

## 2022-12-06 ENCOUNTER — TRANSFERRED RECORDS (OUTPATIENT)
Dept: HEALTH INFORMATION MANAGEMENT | Facility: CLINIC | Age: 52
End: 2022-12-06

## 2022-12-09 ENCOUNTER — OFFICE VISIT (OUTPATIENT)
Dept: FAMILY MEDICINE | Facility: CLINIC | Age: 52
End: 2022-12-09
Payer: COMMERCIAL

## 2022-12-09 VITALS
RESPIRATION RATE: 15 BRPM | HEIGHT: 69 IN | TEMPERATURE: 97.8 F | WEIGHT: 173 LBS | BODY MASS INDEX: 25.62 KG/M2 | OXYGEN SATURATION: 98 % | SYSTOLIC BLOOD PRESSURE: 122 MMHG | HEART RATE: 74 BPM | DIASTOLIC BLOOD PRESSURE: 76 MMHG

## 2022-12-09 DIAGNOSIS — Z23 NEED FOR SHINGLES VACCINE: ICD-10-CM

## 2022-12-09 DIAGNOSIS — Z12.11 COLON CANCER SCREENING: ICD-10-CM

## 2022-12-09 DIAGNOSIS — Z71.89 ADVANCED DIRECTIVES, COUNSELING/DISCUSSION: ICD-10-CM

## 2022-12-09 DIAGNOSIS — Z11.4 ENCOUNTER FOR SCREENING FOR HIV: ICD-10-CM

## 2022-12-09 DIAGNOSIS — Z90.49 HISTORY OF TOTAL COLECTOMY: ICD-10-CM

## 2022-12-09 DIAGNOSIS — Z00.00 HEALTH CARE MAINTENANCE: ICD-10-CM

## 2022-12-09 DIAGNOSIS — R35.0 URINARY FREQUENCY: ICD-10-CM

## 2022-12-09 DIAGNOSIS — Z63.79 STRESSFUL LIFE EVENTS AFFECTING FAMILY AND HOUSEHOLD: ICD-10-CM

## 2022-12-09 DIAGNOSIS — F43.22 ADJUSTMENT DISORDER WITH ANXIOUS MOOD: ICD-10-CM

## 2022-12-09 DIAGNOSIS — Z13.6 ENCOUNTER FOR LIPID SCREENING FOR CARDIOVASCULAR DISEASE: ICD-10-CM

## 2022-12-09 DIAGNOSIS — R05.9 COUGH, UNSPECIFIED TYPE: ICD-10-CM

## 2022-12-09 DIAGNOSIS — Z87.19 HISTORY OF ULCERATIVE COLITIS: ICD-10-CM

## 2022-12-09 DIAGNOSIS — R74.8 ELEVATED LIVER ENZYMES: ICD-10-CM

## 2022-12-09 DIAGNOSIS — Z30.2 ENCOUNTER FOR VASECTOMY: ICD-10-CM

## 2022-12-09 DIAGNOSIS — Z13.220 ENCOUNTER FOR LIPID SCREENING FOR CARDIOVASCULAR DISEASE: ICD-10-CM

## 2022-12-09 DIAGNOSIS — Z23 NEED FOR HEPATITIS B VACCINATION: ICD-10-CM

## 2022-12-09 DIAGNOSIS — H53.9 VISUAL DISTURBANCE: ICD-10-CM

## 2022-12-09 DIAGNOSIS — Z13.1 SCREENING FOR DIABETES MELLITUS: ICD-10-CM

## 2022-12-09 DIAGNOSIS — Z00.00 ROUTINE HISTORY AND PHYSICAL EXAMINATION OF ADULT: Primary | ICD-10-CM

## 2022-12-09 DIAGNOSIS — G47.9 SLEEP DISORDER: ICD-10-CM

## 2022-12-09 DIAGNOSIS — N20.0 NEPHROLITHIASIS: ICD-10-CM

## 2022-12-09 DIAGNOSIS — Z23 NEED FOR PROPHYLACTIC VACCINATION AND INOCULATION AGAINST INFLUENZA: ICD-10-CM

## 2022-12-09 PROBLEM — K56.609 SBO (SMALL BOWEL OBSTRUCTION) (H): Status: RESOLVED | Noted: 2020-02-21 | Resolved: 2022-12-09

## 2022-12-09 PROBLEM — R10.11 RIGHT UPPER QUADRANT ABDOMINAL PAIN: Status: RESOLVED | Noted: 2018-03-03 | Resolved: 2022-12-09

## 2022-12-09 PROBLEM — R89.4 POSITIVE TEST FOR HERPES SIMPLEX VIRUS (HSV) ANTIBODY: Status: RESOLVED | Noted: 2018-03-08 | Resolved: 2022-12-09

## 2022-12-09 PROBLEM — N23 RENAL COLIC ON LEFT SIDE: Status: RESOLVED | Noted: 2017-08-25 | Resolved: 2022-12-09

## 2022-12-09 PROCEDURE — 99213 OFFICE O/P EST LOW 20 MIN: CPT | Mod: 25 | Performed by: FAMILY MEDICINE

## 2022-12-09 PROCEDURE — 99396 PREV VISIT EST AGE 40-64: CPT | Mod: 25 | Performed by: FAMILY MEDICINE

## 2022-12-09 ASSESSMENT — ANXIETY QUESTIONNAIRES
7. FEELING AFRAID AS IF SOMETHING AWFUL MIGHT HAPPEN: NOT AT ALL
3. WORRYING TOO MUCH ABOUT DIFFERENT THINGS: NOT AT ALL
1. FEELING NERVOUS, ANXIOUS, OR ON EDGE: NOT AT ALL
5. BEING SO RESTLESS THAT IT IS HARD TO SIT STILL: NOT AT ALL
IF YOU CHECKED OFF ANY PROBLEMS ON THIS QUESTIONNAIRE, HOW DIFFICULT HAVE THESE PROBLEMS MADE IT FOR YOU TO DO YOUR WORK, TAKE CARE OF THINGS AT HOME, OR GET ALONG WITH OTHER PEOPLE: NOT DIFFICULT AT ALL
8. IF YOU CHECKED OFF ANY PROBLEMS, HOW DIFFICULT HAVE THESE MADE IT FOR YOU TO DO YOUR WORK, TAKE CARE OF THINGS AT HOME, OR GET ALONG WITH OTHER PEOPLE?: NOT DIFFICULT AT ALL
2. NOT BEING ABLE TO STOP OR CONTROL WORRYING: NOT AT ALL
7. FEELING AFRAID AS IF SOMETHING AWFUL MIGHT HAPPEN: NOT AT ALL
6. BECOMING EASILY ANNOYED OR IRRITABLE: NOT AT ALL
GAD7 TOTAL SCORE: 0
GAD7 TOTAL SCORE: 0
4. TROUBLE RELAXING: NOT AT ALL
GAD7 TOTAL SCORE: 0

## 2022-12-09 ASSESSMENT — ENCOUNTER SYMPTOMS
SORE THROAT: 0
ABDOMINAL PAIN: 0
NAUSEA: 0
CHILLS: 0
HEMATURIA: 0
EYE PAIN: 0
CONSTIPATION: 0
DIARRHEA: 0
HEMATOCHEZIA: 0
FEVER: 0
DYSURIA: 0
DIZZINESS: 0
COUGH: 1
WEAKNESS: 0
ARTHRALGIAS: 0
NERVOUS/ANXIOUS: 0
SHORTNESS OF BREATH: 0
PALPITATIONS: 0
HEARTBURN: 0
MYALGIAS: 0
PARESTHESIAS: 0
HEADACHES: 0
FREQUENCY: 1
JOINT SWELLING: 0

## 2022-12-09 ASSESSMENT — PATIENT HEALTH QUESTIONNAIRE - PHQ9
10. IF YOU CHECKED OFF ANY PROBLEMS, HOW DIFFICULT HAVE THESE PROBLEMS MADE IT FOR YOU TO DO YOUR WORK, TAKE CARE OF THINGS AT HOME, OR GET ALONG WITH OTHER PEOPLE: NOT DIFFICULT AT ALL
SUM OF ALL RESPONSES TO PHQ QUESTIONS 1-9: 0
SUM OF ALL RESPONSES TO PHQ QUESTIONS 1-9: 0

## 2022-12-09 NOTE — Clinical Note
Has had a total colectomy in past for UC, so will not ever get a colonoscopy how do I update his health maintenance

## 2022-12-09 NOTE — PROGRESS NOTES
SUBJECTIVE:   CC: CATA is an 51 year old who presents for preventative health visit.   Patient has been advised of split billing requirements and indicates understanding: Yes  Healthy Habits:     Getting at least 3 servings of Calcium per day:  Yes    Bi-annual eye exam:  NO    Dental care twice a year:  Yes    Sleep apnea or symptoms of sleep apnea:  None    Diet:  Regular (no restrictions)    Frequency of exercise:  4-5 days/week    Duration of exercise:  45-60 minutes    Taking medications regularly:  Yes    Medication side effects:  Other    PHQ-2 Total Score: 0    Additional concerns today:  Yes    Answers for HPI/ROS submitted by the patient on 2022  If you checked off any problems, how difficult have these problems made it for you to do your work, take care of things at home, or get along with other people?: Not difficult at all  PHQ9 TOTAL SCORE: 0  KELLY 7 TOTAL SCORE: 0    51 yr old with hx of UC s/p total colectomy, hx of SBO in , transaminitis unknown etiology requiring hospitalization, hx of HSV abx on testing in past, resolved leg weakness, hx of nephrolithiasis, urinary frequency, hx of stressful family events effecting family, adjustment disorder affecting sleep on trazodone since , MNPMP negative.   Under care of PCP Dr Moss, no regular care    Here for a physical  Came early for apt & had to leave by 330 pm for another apt  Noted hereto discuss medications   In 2022 father   2 weeks later wife asked for divorce  He Quit drinking  Started exercising  Changed his diet entirely & lost 20 plus pound intentionally   Started trazodone 50 mg in august for sleep disorder due to adjustment/ stress as recently going through a divorce  Lately has felt saliva thick and had a cough   Stomach gurgles   Seen by [facility] for pouchoscopy next week has had prior colectomy  Wants a vasectomy    Cough started months ago,  tested neg for Covid , up to date with primary series. Feels back of throat.  Feels from not being well hydrated. Hard to keep up with fluids. saliva thick and makes him cough. Feels has no acid reflux. ? PND. No known allergies    Visual disturbance to get eye exam    On trazodone for sleep. Noted ever since taking it wakes up at night at 2 to 3 am to poop. Sleep important so not ready to try coming off trazodone to see if would make a difference. Low scores PHQ , amy, has a counselor. Feels great on trazodone. Has enough refills. To get pouchoscopy next week so will see what that shows    UC dx 1995, had colectomy in 2000, on a lot of meds Asacol, prednisone etc before colectomy. Now on metamucil 2/ day. Under care [facility]. Seen gi after 20 yrs & saw them recently & to get a pouchoscopy next week.     Hx of elevated LFTS in 2017,  hospitalized couple nights 2 yrs ago, unclear cause, thought non specific viral. It resolved on its own  In the past drank Alcohol 2 to 3 times / week one night might be heavy. No alcohol at all in past few months.    SBO a different time, hospitalized for 24 hr & notes was a horrible experience    Kidney stone hx, not had symptoms long time. No blood in urine    Urine freq due to drinking more water. No dysuria, not bothered by it.    HM reviewed.  No ACP on file & no infor needed    Colon cancer screening noted in epic but has no colon, to get pouchoscopy next week with [facility]    Will do Covid booster another day  Hep B vaccine not sure if had will think about it   Was to get flu shot but the running out of time  Will make a apt for labs another day     Today's PHQ-2 Score:   PHQ-2 ( 1999 Pfizer) 12/9/2022   Q1: Little interest or pleasure in doing things 0   Q2: Feeling down, depressed or hopeless 0   PHQ-2 Score 0   Q1: Little interest or pleasure in doing things Not at all   Q2: Feeling down, depressed or hopeless Not at all   PHQ-2 Score 0     Social History     Tobacco Use     Smoking status: Never     Smokeless tobacco: Never   Substance Use Topics      Alcohol use: Not Currently     Comment: Alcoholic Drinks/day: 4 pints a week Now down to 4 /weeks     Alcohol Use 12/9/2022   Prescreen: >3 drinks/day or >7 drinks/week? Not Applicable     Last PSA: No results found for: PSA    Reviewed orders with patient. Reviewed health maintenance and updated orders accordingly - Yes  Lab work is in process  Labs reviewed in EPIC  BP Readings from Last 3 Encounters:   12/09/22 122/76   08/09/22 130/80   02/25/20 (!) 134/95    Wt Readings from Last 3 Encounters:   12/09/22 78.5 kg (173 lb)   08/09/22 80.4 kg (177 lb 4 oz)   02/25/20 87.5 kg (193 lb)            Patient Active Problem List   Diagnosis     History of ulcerative colitis     History of total colectomy     Nephrolithiasis     Elevated liver enzymes     Past Surgical History:   Procedure Laterality Date     COLECTOMY TOTAL         Social History     Tobacco Use     Smoking status: Never     Smokeless tobacco: Never   Substance Use Topics     Alcohol use: Not Currently     Comment: Alcoholic Drinks/day: 4 pints a week Now down to 4 /weeks     Family History   Problem Relation Age of Onset     Dementia Paternal Uncle      Breast Cancer Maternal Grandmother      Rheumatoid Arthritis Mother          Current Outpatient Medications   Medication Sig Dispense Refill     traZODone (DESYREL) 50 MG tablet Take 1-2 tablets ( mg) by mouth At Bedtime 60 tablet 3     No Known Allergies  Recent Labs   Lab Test 02/25/20  1429 02/22/20  0732 02/20/20  2256 03/08/18  1528   A1C 5.1  --   --   --    *  --   --   --    HDL 56  --   --   --    TRIG 222*  --   --   --    ALT  --  51* 50* 315*   CR  --  0.77 0.82  --    GFRESTIMATED  --  >60 >60  --    GFRESTBLACK  --  >60 >60  --    POTASSIUM  --  4.0 4.1  --         Reviewed and updated as needed this visit by clinical staff   Tobacco  Allergies  Meds  Problems  Med Hx  Surg Hx  Fam Hx  Soc   Hx        Reviewed and updated as needed this visit by Provider   Lucian   "Allergies  Meds  Problems  Med Hx  Surg Hx  Fam Hx  Soc   Hx       Past Medical History:   Diagnosis Date     Anemia      Anemia 4/19/2016     Kidney stone     3/2016     Leg weakness, bilateral 4/19/2016     Nephrolithiasis      Positive test for herpes simplex virus (HSV) antibody 3/8/2018     Renal colic on left side 8/25/2017     Right upper quadrant abdominal pain 3/3/2018     SBO (small bowel obstruction) (H) 2/21/2020     Ulcerative colitis (H) 2001    History of       Past Surgical History:   Procedure Laterality Date     COLECTOMY TOTAL       OB History   No obstetric history on file.       Review of Systems   Constitutional: Negative for chills and fever.   HENT: Negative for congestion, ear pain, hearing loss and sore throat.    Eyes: Positive for visual disturbance. Negative for pain.   Respiratory: Positive for cough. Negative for shortness of breath.    Cardiovascular: Negative for chest pain, palpitations and peripheral edema.   Gastrointestinal: Negative for abdominal pain, constipation, diarrhea, heartburn, hematochezia and nausea.   Genitourinary: Positive for frequency. Negative for dysuria, genital sores, hematuria and urgency.   Musculoskeletal: Negative for arthralgias, joint swelling and myalgias.   Skin: Negative for rash.   Neurological: Negative for dizziness, weakness, headaches and paresthesias.   Psychiatric/Behavioral: Negative for mood changes. The patient is not nervous/anxious.      OBJECTIVE:   /76   Pulse 74   Temp 97.8  F (36.6  C) (Temporal)   Resp 15   Ht 1.753 m (5' 9\")   Wt 78.5 kg (173 lb)   SpO2 98%   BMI 25.55 kg/m      Physical Exam  GENERAL: healthy, alert and no distress  EYES: Eyes grossly normal to inspection, PERRL and conjunctivae and sclerae normal  HENT: ear canals and TM's normal, nose and mouth without ulcers or lesions  NECK: no adenopathy, no asymmetry, masses, or scars and thyroid normal to palpation  RESP: lungs clear to auscultation - no " rales, rhonchi or wheezes  CV: regular rate and rhythm, normal S1 S2, no S3 or S4, no murmur, click or rub, no peripheral edema and peripheral pulses strong  ABDOMEN: soft, nontender, no hepatosplenomegaly, no masses and bowel sounds normal, midline scar  MS: no gross musculoskeletal defects noted, no edema  SKIN: no suspicious lesions or rashes  NEURO: Normal strength and tone, mentation intact and speech normal  PSYCH: mentation appears normal, affect normal/bright, speech pressured, judgement and insight intact and appearance well groomed    Diagnostic Test Results:  Labs reviewed in Epic  No results found for this or any previous visit (from the past 24 hour(s)).  No results found for any visits on 12/09/22.    ASSESSMENT/PLAN:       ICD-10-CM    1. Routine history and physical examination of adult  Z00.00 Lipid Profile (Chol, Trig, HDL, LDL calc)     Hemoglobin A1c      2. Cough, unspecified type  R05.9       3. Visual disturbance  H53.9       4. Adjustment disorder with anxious mood  F43.22 TSH with free T4 reflex      5. Sleep disorder  G47.9 TSH with free T4 reflex      6. Stressful life events affecting family and household  Z63.79 TSH with free T4 reflex      7. History of ulcerative colitis  Z87.19 CBC with platelets and differential     Comprehensive metabolic panel (BMP + Alb, Alk Phos, ALT, AST, Total. Bili, TP)     Iron and iron binding capacity     Ferritin     Vitamin B12      8. History of total colectomy  Z90.49 Comprehensive metabolic panel (BMP + Alb, Alk Phos, ALT, AST, Total. Bili, TP)      9. Elevated liver enzymes  R74.8 Comprehensive metabolic panel (BMP + Alb, Alk Phos, ALT, AST, Total. Bili, TP)     Iron and iron binding capacity     Ferritin     Vitamin B12      10. Nephrolithiasis  N20.0 Comprehensive metabolic panel (BMP + Alb, Alk Phos, ALT, AST, Total. Bili, TP)      11. Urinary frequency  R35.0       12. Health care maintenance  Z00.00 REVIEW OF HEALTH MAINTENANCE PROTOCOL ORDERS       13. Advanced directives, counseling/discussion  Z71.89       14. Colon cancer screening  Z12.11       15. Need for prophylactic vaccination and inoculation against influenza  Z23 CANCELED: INFLUENZA QUAD, RECOMBINANT, P-FREE (RIV4) (FLUBLOK)      16. Need for hepatitis B vaccination  Z23       17. Need for shingles vaccine  Z23       18. Encounter for lipid screening for cardiovascular disease  Z13.220 Lipid Profile (Chol, Trig, HDL, LDL calc)    Z13.6       19. Screening for diabetes mellitus  Z13.1 Hemoglobin A1c      20. Encounter for screening for HIV  Z11.4       21. Encounter for vasectomy  Z30.2 Adult Urology  Referral      Seen for preventive health and additional concerns today     History of persistent cough started months ago.  Negative for home COVID tests several times.  Up-to-date with all vaccines.  Feels the phlegm/globus sensation in throat.  May be some postnasal drip.  Examination is benign.  Recommend chewing on a lozenges.  Humidifier in the room may help.  Try Flonase and Zyrtec over-the-counter to dry up any postnasal drainage.  If no improvement may do Pepcid 20 mg twice a day over-the-counter for 2 weeks as most common cause of globus sensation in the throat is due to occult heartburn reflux.  Sleep with the head of the bed elevated.  Avoid alcohol like  doing.  Frequent small meals and avoiding eating 3 hours before bedtime may be helpful.  Lungs are clear.  If symptoms do not improve can do imaging and refer to GI for endoscopy.    Has appointment to see the eye doctor for visual disturbance likely needs glasses.    Hx of adjustment disorder with affect on sleep due to stressful events recently in life and family.  Improved with trazodone 50 mg at bedtime started 8/2022.  Has enough refills for now.  Continue same.  Continue with his counselor.  Only way to tell if trazodone is contributing to stomach symptoms may be to stop the trazodone but currently sleep is more important so  opted to continue.    History of ulcerative colitis no longer on meds since had a total colectomy in 2000.  Under care of [facility] and to get a pouchoscopy next week.  Screening colonoscopy not indicated as does not have a colon.  Will await records from [facility].  Continue on Metamucil twice a day.    History of prior elevated LFTS hospitalized for transaminitis in 2017 work-up was unremarkable other than an HSV antibody positive.  Suspected to be a nonspecific viral illness.  Symptoms since resolved and currently also no longer on alcohol.  We will recheck liver tests and go from there.    Resolved small bowel obstruction in 2020.    Symptomatic kidney stones.  Continue to stay well-hydrated.    Urinary frequency due to increased water intake.  Currently no symptoms of urinary infection.    Referral given to urology as desiring a vasectomy.    Healthcare maintenance reviewed.  Consider working on healthcare directives.  Colon cancer screening likely not indicated as had a total colectomy and to get a pouchoscopy next week we will await records from [facility].  Recommend flu shot yearly but due to time constraints will return for it another day.  Discussed hepatitis B vaccination series of 3 shots at time 0, 2 & 4 month intervals if not immune, will consider another time.  Recommend Shingrex. shingles vaccines a series of 2 shots 2 to 6 months apart that can cause couple days of flu like symptoms but is expensive so to check with insurance and get at pharmacy if cheaper.  Recommend self testicular check regularly   Labs ordered and will return another day to get this done due to time constraints today.  Recommended screening for HIV once as an adult, reported had had it previously and was negative so did not order this.    Return in 1 year for preventive physical and sooner in an office visit.    Patient has been advised of split billing requirements and indicates understanding: Yes      COUNSELING:    Reviewed preventive health counseling, as reflected in patient instructions       Regular exercise       Healthy diet/nutrition       Vision screening       Immunizations       Alcohol Use        Consider Hep C screening for all patients one time for ages 18-79 years       HIV screeninx in teen years, 1x in adult years, and at intervals if high risk       Colorectal cancer screening       The 10-year ASCVD risk score (Aracely REYES, et al., 2019) is: 3.5%    Values used to calculate the score:      Age: 51 years      Sex: Male      Is Non- : No      Diabetic: No      Tobacco smoker: No      Systolic Blood Pressure: 122 mmHg      Is BP treated: No      HDL Cholesterol: 56 mg/dL      Total Cholesterol: 231 mg/dL       Advance Care Planning        He reports that he has never smoked. He has never used smokeless tobacco.        Tiffanie Burnham MD  [facility]

## 2022-12-09 NOTE — PATIENT INSTRUCTIONS
Seen for preventive health and additional concerns today     History of persistent cough started months ago.  Negative for home COVID tests several times.  Up-to-date with all vaccines.  Feels the phlegm/globus sensation in throat.  May be some postnasal drip.  Examination is benign.  Recommend chewing on a lozenge.  Humidifier in the room.  Try Flonase and Zyrtec over-the-counter to dry up any postnasal drainage.  If no improvement may do Pepcid 20 mg twice a day over-the-counter for 2 weeks as most common cause of globus sensation in the throat is due to occult heartburn reflux.  Sleep with the head of the bed elevated.  Avoid alcohol like you are doing.  Frequent small meals and avoiding eating 3 hours before bedtime may be helpful.  Lungs are clear.  If symptoms do not improve can do imaging and refer to GI for endoscopy.    Has appointment to see the eye doctor for visual disturbance likely needs glasses.    Of adjustment disorder with affect on sleep and stressful events recently in life and family.  Improved with trazodone 50 mg at bedtime.  Has enough refills for now.  Continue same.  Continue with your counselor.  Only way to tell if trazodone is contributing to your stomach symptoms may be to stop the trazodone but currently sleep is more important so opted to continue.    History of ulcerative colitis no longer on meds since had a total colectomy in 2000.  Under care of [facility] and to get a pouchoscopy next week.  Screening colonoscopy not indicated as does not have a colon.  Will await records from [facility].  Continue on Metamucil twice a day.    History of prior elevated LFTs hospitalized for transaminitis in 2017 work-up was unremarkable other than an HSV antibody positive.  Suspected to be a nonspecific viral illness.  Symptoms since resolved and currently also no longer on alcohol.  We will recheck liver tests and go from there.    Resolved small bowel obstruction in 2020.    Symptomatic  kidney stones.  Continue to stay well-hydrated.    Urinary frequency due to increased water intake.  Currently no symptoms of urinary infection.    Referral given to urology as desiring a vasectomy.    Healthcare maintenance reviewed.  Consider working on healthcare directives.  Colon cancer screening likely not indicated as had a total colectomy and to get a pouchoscopy next week we will await records from [facility].  Recommend flu shot yearly but due to time constraints will return for it another day.  Discussed hepatitis B vaccination series of 3 shots at time 0, 2 & 4 month intervals if not immune, will consider another time.  Recommend Shingrix. shingles vaccines a series of 2 shots 2 to 6 months apart that can cause couple days of flu like symptoms but is expensive so check with insurance and get at pharmacy if cheaper.  Recommend self testicular check regularly   Labs ordered and will return another day to get this done due to time constraints today.  Recommended screening for HIV once as an adult, reported had had it previously and was negative so did not order this.    Return in 1 year for preventive physical and sooner in an office visit.    Preventive Health Recommendations  Male Ages 50 - 64    Yearly exam:             See your health care provider every year in order to  o   Review health changes.   o   Discuss preventive care.    o   Review your medicines if your doctor has prescribed any.   Have a cholesterol test every 5 years, or more frequently if you are at risk for high cholesterol/heart disease.   Have a diabetes test (fasting glucose) every three years. If you are at risk for diabetes, you should have this test more often.   Have a colonoscopy at age 50, or have a yearly FIT test (stool test). These exams will check for colon cancer.    Talk with your health care provider about whether or not a prostate cancer screening test (PSA) is right for you.  You should be tested each year for STDs  (sexually transmitted diseases), if you re at risk.     Shots: Get a flu shot each year. Get a tetanus shot every 10 years.     Nutrition:  Eat at least 5 servings of fruits and vegetables daily.   Eat whole-grain bread, whole-wheat pasta and brown rice instead of white grains and rice.   Get adequate Calcium and Vitamin D.     Lifestyle  Exercise for at least 150 minutes a week (30 minutes a day, 5 days a week). This will help you control your weight and prevent disease.   Limit alcohol to one drink per day.   No smoking.   Wear sunscreen to prevent skin cancer.   See your dentist every six months for an exam and cleaning.   See your eye doctor every 1 to 2 years.

## 2022-12-17 ENCOUNTER — LAB (OUTPATIENT)
Dept: LAB | Facility: CLINIC | Age: 52
End: 2022-12-17
Payer: COMMERCIAL

## 2022-12-17 DIAGNOSIS — F43.22 ADJUSTMENT DISORDER WITH ANXIOUS MOOD: ICD-10-CM

## 2022-12-17 DIAGNOSIS — R74.8 ELEVATED LIVER ENZYMES: ICD-10-CM

## 2022-12-17 DIAGNOSIS — Z90.49 HISTORY OF TOTAL COLECTOMY: ICD-10-CM

## 2022-12-17 DIAGNOSIS — N20.0 NEPHROLITHIASIS: ICD-10-CM

## 2022-12-17 DIAGNOSIS — Z13.220 ENCOUNTER FOR LIPID SCREENING FOR CARDIOVASCULAR DISEASE: ICD-10-CM

## 2022-12-17 DIAGNOSIS — Z13.1 SCREENING FOR DIABETES MELLITUS: ICD-10-CM

## 2022-12-17 DIAGNOSIS — Z13.6 ENCOUNTER FOR LIPID SCREENING FOR CARDIOVASCULAR DISEASE: ICD-10-CM

## 2022-12-17 DIAGNOSIS — Z63.79 STRESSFUL LIFE EVENTS AFFECTING FAMILY AND HOUSEHOLD: ICD-10-CM

## 2022-12-17 DIAGNOSIS — Z87.19 HISTORY OF ULCERATIVE COLITIS: ICD-10-CM

## 2022-12-17 DIAGNOSIS — Z00.00 ROUTINE HISTORY AND PHYSICAL EXAMINATION OF ADULT: ICD-10-CM

## 2022-12-17 DIAGNOSIS — G47.9 SLEEP DISORDER: ICD-10-CM

## 2022-12-17 LAB
BASOPHILS # BLD AUTO: 0 10E3/UL (ref 0–0.2)
BASOPHILS NFR BLD AUTO: 0 %
EOSINOPHIL # BLD AUTO: 0.1 10E3/UL (ref 0–0.7)
EOSINOPHIL NFR BLD AUTO: 1 %
ERYTHROCYTE [DISTWIDTH] IN BLOOD BY AUTOMATED COUNT: 11.9 % (ref 10–15)
HBA1C MFR BLD: 5.2 % (ref 0–5.6)
HCT VFR BLD AUTO: 40.9 % (ref 40–53)
HGB BLD-MCNC: 14.2 G/DL (ref 13.3–17.7)
IMM GRANULOCYTES # BLD: 0 10E3/UL
IMM GRANULOCYTES NFR BLD: 0 %
LYMPHOCYTES # BLD AUTO: 1.8 10E3/UL (ref 0.8–5.3)
LYMPHOCYTES NFR BLD AUTO: 24 %
MCH RBC QN AUTO: 30.2 PG (ref 26.5–33)
MCHC RBC AUTO-ENTMCNC: 34.7 G/DL (ref 31.5–36.5)
MCV RBC AUTO: 87 FL (ref 78–100)
MONOCYTES # BLD AUTO: 0.6 10E3/UL (ref 0–1.3)
MONOCYTES NFR BLD AUTO: 8 %
NEUTROPHILS # BLD AUTO: 4.8 10E3/UL (ref 1.6–8.3)
NEUTROPHILS NFR BLD AUTO: 66 %
PLATELET # BLD AUTO: 260 10E3/UL (ref 150–450)
RBC # BLD AUTO: 4.7 10E6/UL (ref 4.4–5.9)
WBC # BLD AUTO: 7.3 10E3/UL (ref 4–11)

## 2022-12-17 PROCEDURE — 82728 ASSAY OF FERRITIN: CPT

## 2022-12-17 PROCEDURE — 80061 LIPID PANEL: CPT

## 2022-12-17 PROCEDURE — 83550 IRON BINDING TEST: CPT

## 2022-12-17 PROCEDURE — 83540 ASSAY OF IRON: CPT

## 2022-12-17 PROCEDURE — 83036 HEMOGLOBIN GLYCOSYLATED A1C: CPT

## 2022-12-17 PROCEDURE — 36415 COLL VENOUS BLD VENIPUNCTURE: CPT

## 2022-12-17 PROCEDURE — 82607 VITAMIN B-12: CPT

## 2022-12-17 PROCEDURE — 80050 GENERAL HEALTH PANEL: CPT

## 2022-12-18 LAB
ALBUMIN SERPL BCG-MCNC: 4.3 G/DL (ref 3.5–5.2)
ALP SERPL-CCNC: 44 U/L (ref 40–129)
ALT SERPL W P-5'-P-CCNC: 27 U/L (ref 10–50)
ANION GAP SERPL CALCULATED.3IONS-SCNC: 13 MMOL/L (ref 7–15)
AST SERPL W P-5'-P-CCNC: 30 U/L (ref 10–50)
BILIRUB SERPL-MCNC: 0.4 MG/DL
BUN SERPL-MCNC: 12.9 MG/DL (ref 6–20)
CALCIUM SERPL-MCNC: 9 MG/DL (ref 8.6–10)
CHLORIDE SERPL-SCNC: 105 MMOL/L (ref 98–107)
CHOLEST SERPL-MCNC: 194 MG/DL
CREAT SERPL-MCNC: 0.84 MG/DL (ref 0.67–1.17)
DEPRECATED HCO3 PLAS-SCNC: 22 MMOL/L (ref 22–29)
FERRITIN SERPL-MCNC: 25 NG/ML (ref 31–409)
GFR SERPL CREATININE-BSD FRML MDRD: >90 ML/MIN/1.73M2
GLUCOSE SERPL-MCNC: 90 MG/DL (ref 70–99)
HDLC SERPL-MCNC: 64 MG/DL
IRON BINDING CAPACITY (ROCHE): 333 UG/DL (ref 240–430)
IRON SATN MFR SERPL: 37 % (ref 15–46)
IRON SERPL-MCNC: 124 UG/DL (ref 61–157)
LDLC SERPL CALC-MCNC: 73 MG/DL
NONHDLC SERPL-MCNC: 130 MG/DL
POTASSIUM SERPL-SCNC: 4.1 MMOL/L (ref 3.4–5.3)
PROT SERPL-MCNC: 6.7 G/DL (ref 6.4–8.3)
SODIUM SERPL-SCNC: 140 MMOL/L (ref 136–145)
TRIGL SERPL-MCNC: 284 MG/DL
TSH SERPL DL<=0.005 MIU/L-ACNC: 0.88 UIU/ML (ref 0.3–4.2)
VIT B12 SERPL-MCNC: 402 PG/ML (ref 232–1245)

## 2022-12-19 NOTE — RESULT ENCOUNTER NOTE
Mirza Mr. Deras,  Thanks for returning to do the labs   Your results came back showing. -Normal red blood cell (hgb) levels, normal white blood cell count and normal platelet levels.  -Triglycerides are elevated which can increase your heart disease risk.  A diet high in fat and simple carbohydrates, genetics and being overweight can contribute to this.  The changes you have made in your diet & weight have improved the ldl the bad cholesterol which is excellent. Your LDL(bad) cholesterol and HDL(good) cholesterol levels are normal.  Contiue to be active, trying to exercise 150 minutes per week (30 minutes 5 days per week or 50 minutes 3 days per week are options), & eating healthy  In 12 months, you should recheck your fasting cholesterol panel.  -Liver and gallbladder tests are normal (ALT,AST, Alk phos, bilirubin), kidney function is normal (Cr, GFR), sodium is normal, potassium is normal, calcium is normal, glucose is normal.  -A1C (diabetic test) is normal and indicates that your blood sugar has been in a normal range the last 3 months.  -TSH (thyroid stimulating hormone) level is normal which indicates normal thyroid function.  -Iron level is normal. The iron storage form ferritin is just a tiny bit low. Go ahead with the pouchoscopy, discuss gi losses of iron with your gastroenterologist & we should recheck this & do further testing after that if remains low & no gi explanation found  Normal vit b 12 levels  If you have any further concerns please do not hesitate to contact us by message, phone or making an appointment.  Have a good day   Dr Tej SHAH

## 2023-02-15 ENCOUNTER — VIRTUAL VISIT (OUTPATIENT)
Dept: UROLOGY | Facility: CLINIC | Age: 53
End: 2023-02-15
Attending: FAMILY MEDICINE
Payer: COMMERCIAL

## 2023-02-15 DIAGNOSIS — Z30.09 VASECTOMY EVALUATION: ICD-10-CM

## 2023-02-15 PROCEDURE — 99243 OFF/OP CNSLTJ NEW/EST LOW 30: CPT | Mod: VID | Performed by: UROLOGY

## 2023-02-15 NOTE — PROGRESS NOTES
CATA is a 52 year old who is being evaluated via a billable video visit.      How would you like to obtain your AVS? MyChart  If the video visit is dropped, the invitation should be resent by: Text to cell phone: [phone]  Will anyone else be joining your video visit? No              Subjective   CATA is a 52 year old, presenting for the following health issues:  Consult (Vasectomy )      HPI     Patient was requested by Dr. Burnham for vasectomy consult.  He has 2 children.    Review of Systems   Constitutional, HEENT, cardiovascular, pulmonary, gi and gu systems are negative, except as otherwise noted.      Objective           Vitals:  No vitals were obtained today due to virtual visit.    Physical Exam   GENERAL: Healthy, alert and no distress  EYES: Eyes grossly normal to inspection.  No discharge or erythema, or obvious scleral/conjunctival abnormalities.  RESP: No audible wheeze, cough, or visible cyanosis.  No visible retractions or increased work of breathing.    SKIN: Visible skin clear. No significant rash, abnormal pigmentation or lesions.  NEURO: Cranial nerves grossly intact.  Mentation and speech appropriate for age.  PSYCH: Mentation appears normal, affect normal/bright, judgement and insight intact, normal speech and appearance well-groomed.        Discussed    That vasectomy is permanent method of birth control.    That vasectomy can fail due to recanalization of the vas even many months/years later.    That he needs 2 negative sperm checks to be considered sterile    That there are other methods that are not permanent and also that the sperm can be frozen for later use.    How the technique is performed, risks of infection, bleeding, damage to the testes vessels and testes atrophy    Long term complication such as chronic and difficult to treat testes pain and questionable increase incidence of prostate cancer    That the procedure can be done at the clinic or hospital OR        Plan:    Stop  Aspirin  Will schedule Vasectomy in the future          Video-Visit Details    Type of service:  Video Visit     Originating Location (pt. Location): Home    Distant Location (provider location):  On-site  Platform used for Video Visit: [email or web address]

## 2023-03-16 ENCOUNTER — NURSE TRIAGE (OUTPATIENT)
Dept: NURSING | Facility: CLINIC | Age: 53
End: 2023-03-16
Payer: COMMERCIAL

## 2023-03-16 DIAGNOSIS — F43.22 ADJUSTMENT DISORDER WITH ANXIOUS MOOD: ICD-10-CM

## 2023-03-16 DIAGNOSIS — G47.9 SLEEP DISORDER: ICD-10-CM

## 2023-03-16 RX ORDER — TRAZODONE HYDROCHLORIDE 50 MG/1
TABLET, FILM COATED ORAL
Qty: 60 TABLET | Refills: 3 | Status: SHIPPED | OUTPATIENT
Start: 2023-03-16 | End: 2023-08-04

## 2023-03-16 NOTE — TELEPHONE ENCOUNTER
Medication Question or Refill    Contacts       Type Contact Phone/Fax    03/16/2023 01:53 PM CDT Interface (Incoming) [id number] - [place], [place] - [address] [facility] [phone]    03/16/2023 02:00 PM CDT Phone (Incoming) [id number] - [place], [place] - [address] [facility] [phone]          What medication are you calling about (include dose and sig)?: traZODone (DESYREL) 50 MG tablet    Preferred Pharmacy:   [facility][id number] - [place], [place] - [address] [facility]  [address]  [place] [address]  Phone: [phone] Fax: [phone]    Controlled Substance Agreement on file:   CSA -- Patient Level:     [Media Unavailable] Controlled Substance Agreement - Non - Opioid - Scan on 5/7/2019       Who prescribed the medication?: Humberto    Do you need a refill? Yes, would like refill send in today, covering provider address?    When did you use the medication last? 3/16/23    Patient offered an appointment? No    Do you have any questions or concerns?  No      Could we send this information to you in [facility] or would you prefer to receive a phone call?:   Patient would prefer a phone call   Okay to leave a detailed message?: Yes at Cell number on file:    Telephone Information:   Mobile [phone]

## 2023-03-16 NOTE — TELEPHONE ENCOUNTER
Calling to see if Trazodone has been refilled per his request.  Writer called [facility] #[id number].  Medication has still not been sent from the office to Pharmacy.  Pharmacy will give the patient s few pills to tide him over until  new RX is received.  Writer called patient back.  He will go the [facility] now.  Writer will send message to provider.    Amy Bhakta RN, [facility] Triage Nurse Advisor      Reason for Disposition    [1] Caller has NON-URGENT medicine question about med that PCP prescribed AND [2] triager unable to answer question    Protocols used: MEDICATION REFILL AND RENEWAL CALL-A-

## 2023-03-17 RX ORDER — TRAZODONE HYDROCHLORIDE 50 MG/1
TABLET, FILM COATED ORAL
Qty: 60 TABLET | Refills: 3 | OUTPATIENT
Start: 2023-03-17

## 2023-03-29 ENCOUNTER — TRANSFERRED RECORDS (OUTPATIENT)
Dept: HEALTH INFORMATION MANAGEMENT | Facility: CLINIC | Age: 53
End: 2023-03-29
Payer: COMMERCIAL

## 2023-04-11 ENCOUNTER — OFFICE VISIT (OUTPATIENT)
Dept: UROLOGY | Facility: CLINIC | Age: 53
End: 2023-04-11
Payer: COMMERCIAL

## 2023-04-11 DIAGNOSIS — Z30.2 ENCOUNTER FOR STERILIZATION: Primary | ICD-10-CM

## 2023-04-11 PROCEDURE — 55250 REMOVAL OF SPERM DUCT(S): CPT | Performed by: UROLOGY

## 2023-04-11 PROCEDURE — 99000 SPECIMEN HANDLING OFFICE-LAB: CPT | Performed by: UROLOGY

## 2023-04-11 PROCEDURE — 88302 TISSUE EXAM BY PATHOLOGIST: CPT | Performed by: PATHOLOGY

## 2023-04-13 LAB
PATH REPORT.COMMENTS IMP SPEC: NORMAL
PATH REPORT.COMMENTS IMP SPEC: NORMAL
PATH REPORT.FINAL DX SPEC: NORMAL
PATH REPORT.GROSS SPEC: NORMAL
PATH REPORT.MICROSCOPIC SPEC OTHER STN: NORMAL
PATH REPORT.RELEVANT HX SPEC: NORMAL
PHOTO IMAGE: NORMAL

## 2023-08-04 DIAGNOSIS — F43.22 ADJUSTMENT DISORDER WITH ANXIOUS MOOD: ICD-10-CM

## 2023-08-04 DIAGNOSIS — G47.9 SLEEP DISORDER: ICD-10-CM

## 2023-08-04 RX ORDER — TRAZODONE HYDROCHLORIDE 50 MG/1
TABLET, FILM COATED ORAL
Qty: 180 TABLET | Refills: 1 | Status: SHIPPED | OUTPATIENT
Start: 2023-08-04 | End: 2024-04-11

## 2023-08-04 NOTE — TELEPHONE ENCOUNTER
"Last Written Prescription Date:  3/16/2023  Last Fill Quantity: 60,  # refills: 3   Last office visit provider:  12/9/2022     Requested Prescriptions   Pending Prescriptions Disp Refills    traZODone (DESYREL) 50 MG tablet [Pharmacy Med Name: TRAZODONE 50MG TABLETS] 60 tablet 3     Sig: TAKE 1 TO 2 TABLETS(50  MG) BY MOUTH AT BEDTIME       Serotonin Modulators Passed - 8/4/2023  8:07 AM        Passed - Recent (12 mo) or future (30 days) visit within the authorizing provider's specialty     Patient has had an office visit with the authorizing provider or a provider within the authorizing providers department within the previous 12 mos or has a future within next 30 days. See \"Patient Info\" tab in inbasket, or \"Choose Columns\" in Meds & Orders section of the refill encounter.              Passed - Medication is active on med list        Passed - Patient is age 18 or older             Lesley Joshi RN 08/04/23 11:06 AM  "

## 2023-11-09 ENCOUNTER — PATIENT OUTREACH (OUTPATIENT)
Dept: CARE COORDINATION | Facility: CLINIC | Age: 53
End: 2023-11-09
Payer: COMMERCIAL

## 2023-11-23 ENCOUNTER — PATIENT OUTREACH (OUTPATIENT)
Dept: CARE COORDINATION | Facility: CLINIC | Age: 53
End: 2023-11-23
Payer: COMMERCIAL

## 2023-12-26 ENCOUNTER — PATIENT OUTREACH (OUTPATIENT)
Dept: CARE COORDINATION | Facility: CLINIC | Age: 53
End: 2023-12-26
Payer: COMMERCIAL

## 2024-01-09 ENCOUNTER — PATIENT OUTREACH (OUTPATIENT)
Dept: CARE COORDINATION | Facility: CLINIC | Age: 54
End: 2024-01-09
Payer: COMMERCIAL

## 2024-03-02 ENCOUNTER — HEALTH MAINTENANCE LETTER (OUTPATIENT)
Age: 54
End: 2024-03-02

## 2024-04-09 ENCOUNTER — MYC REFILL (OUTPATIENT)
Dept: FAMILY MEDICINE | Facility: CLINIC | Age: 54
End: 2024-04-09
Payer: COMMERCIAL

## 2024-04-09 DIAGNOSIS — G47.9 SLEEP DISORDER: ICD-10-CM

## 2024-04-09 DIAGNOSIS — F43.22 ADJUSTMENT DISORDER WITH ANXIOUS MOOD: ICD-10-CM

## 2024-04-09 RX ORDER — TRAZODONE HYDROCHLORIDE 50 MG/1
50-100 TABLET, FILM COATED ORAL AT BEDTIME
Qty: 180 TABLET | Refills: 1 | OUTPATIENT
Start: 2024-04-09

## 2024-04-09 NOTE — TELEPHONE ENCOUNTER
Patient is out of this medication, needs it tonight and is requesting this gets taken care of asap.     Let patient know I am more then happy to send this request over to pcp but it has been 3 years since he has seen pcp so an appointment is needed. He is very upset that he needs to schedule an appointment because he needs the medication tonight, that an appointment is not his top priority right now.    Pt scheduled appointment for 05/02/24 but was not happy about it. He is requesting a 1-month bridge and that it needs to be filled tonight.     Please advise.

## 2024-04-10 DIAGNOSIS — F43.22 ADJUSTMENT DISORDER WITH ANXIOUS MOOD: ICD-10-CM

## 2024-04-10 DIAGNOSIS — G47.9 SLEEP DISORDER: ICD-10-CM

## 2024-04-10 RX ORDER — TRAZODONE HYDROCHLORIDE 50 MG/1
50-100 TABLET, FILM COATED ORAL AT BEDTIME
Qty: 180 TABLET | Refills: 1 | Status: CANCELLED | OUTPATIENT
Start: 2024-04-10

## 2024-04-10 RX ORDER — TRAZODONE HYDROCHLORIDE 50 MG/1
50-100 TABLET, FILM COATED ORAL AT BEDTIME
Qty: 180 TABLET | Refills: 1 | OUTPATIENT
Start: 2024-04-10

## 2024-04-10 NOTE — TELEPHONE ENCOUNTER
Patient is scheduled in your next available slot on 06/18. States last night he was without TRAZODONE for the first time and took melatonin which was not effective. And is now affecting him during the day.      Would like to know if prescribing will bridge until upcoming appointments.     PCP appt on 05/02  [place] appt on 06/18 (soonest)     Ph[facility]: [facility]

## 2024-04-10 NOTE — TELEPHONE ENCOUNTER
Humberto notified TC patient should see PCP regarding medication request.     Relayed message to patient and was able to reschedule the 05/02 appointment to 04/11 arrival 9:10 am. Patient confirmed appointment.

## 2024-04-11 ENCOUNTER — OFFICE VISIT (OUTPATIENT)
Dept: FAMILY MEDICINE | Facility: CLINIC | Age: 54
End: 2024-04-11
Payer: COMMERCIAL

## 2024-04-11 VITALS
RESPIRATION RATE: 16 BRPM | HEIGHT: 69 IN | HEART RATE: 59 BPM | SYSTOLIC BLOOD PRESSURE: 128 MMHG | BODY MASS INDEX: 27.4 KG/M2 | OXYGEN SATURATION: 99 % | WEIGHT: 185 LBS | DIASTOLIC BLOOD PRESSURE: 88 MMHG

## 2024-04-11 DIAGNOSIS — F43.22 ADJUSTMENT DISORDER WITH ANXIOUS MOOD: Primary | ICD-10-CM

## 2024-04-11 DIAGNOSIS — G47.9 SLEEP DISORDER: ICD-10-CM

## 2024-04-11 PROBLEM — R74.8 ELEVATED LIVER ENZYMES: Status: RESOLVED | Noted: 2018-03-03 | Resolved: 2024-04-11

## 2024-04-11 PROCEDURE — 99213 OFFICE O/P EST LOW 20 MIN: CPT | Performed by: FAMILY MEDICINE

## 2024-04-11 RX ORDER — TRAZODONE HYDROCHLORIDE 50 MG/1
50-100 TABLET, FILM COATED ORAL AT BEDTIME
Qty: 180 TABLET | Refills: 3 | Status: SHIPPED | OUTPATIENT
Start: 2024-04-11

## 2024-04-11 NOTE — PROGRESS NOTES
"  Assessment & Plan     ICD-10-CM    1. Adjustment disorder with anxious mood  F43.22 traZODone (DESYREL) 50 MG tablet      2. Sleep disorder  G47.9 traZODone (DESYREL) 50 MG tablet        Patient is a 53-year-old gentleman with history of ulcerative colitis, status post total colectomy who presents today for refill of medication for insomnia.  He otherwise feels well and denies any acute concerns.  Reviewed chart and this was started by another provider in 2022.  Patient notes benefit from the medication.  This will be refilled for him.  Reviewed lab testing and physical from previous visit.  This was normal.  Follow-up for annual physical exam.      BMI  Estimated body mass index is 27.32 kg/m  as calculated from the following:    Height as of this encounter: 1.753 m (5' 9\").    Weight as of this encounter: 83.9 kg (185 lb).         MEDICATIONS:  Continue current medications without change  See Patient Instructions    Millicent   CATA is a 53 year old, presenting for the following health issues:  Sleep Problem (Refill on sleep medication)        4/11/2024     9:06 AM   Additional Questions   Roomed by Lanie Nixon CMA     History of Present Illness       Reason for visit:  Check up    He eats 2-3 servings of fruits and vegetables daily.He consumes 0 sweetened beverage(s) daily.He exercises with enough effort to increase his heart rate 20 to 29 minutes per day.  He exercises with enough effort to increase his heart rate 4 days per week.   He is taking medications regularly.     Patient Active Problem List   Diagnosis     History of ulcerative colitis     History of total colectomy     Nephrolithiasis     S/P colectomy     Current Outpatient Medications   Medication Sig Dispense Refill     traZODone (DESYREL) 50 MG tablet Take 1-2 tablets ( mg) by mouth at bedtime 180 tablet 3     No current facility-administered medications for this visit.           Review of Systems  Constitutional, HEENT, cardiovascular, " "pulmonary, gi and gu systems are negative, except as otherwise noted.      Objective    /88 (BP Location: Left arm, Patient Position: Sitting, Cuff Size: Adult Regular)   Pulse 59   Resp 16   Ht 1.753 m (5' 9\")   Wt 83.9 kg (185 lb)   SpO2 99%   BMI 27.32 kg/m    Body mass index is 27.32 kg/m .  Physical Exam   GENERAL: alert and no distress    Lab Review     Lab Results   Component Value Date    WBC 7.3 12/17/2022    HGB 14.2 12/17/2022    HCT 40.9 12/17/2022    MCV 87 12/17/2022     12/17/2022     Lab Results   Component Value Date    CHOL 194 12/17/2022    TRIG 284 12/17/2022    HDL 64 12/17/2022              Signed Electronically by: Maggi Moss MD    "

## 2024-09-30 ENCOUNTER — MYC REFILL (OUTPATIENT)
Dept: FAMILY MEDICINE | Facility: CLINIC | Age: 54
End: 2024-09-30
Payer: COMMERCIAL

## 2024-09-30 DIAGNOSIS — G47.9 SLEEP DISORDER: ICD-10-CM

## 2024-09-30 DIAGNOSIS — F43.22 ADJUSTMENT DISORDER WITH ANXIOUS MOOD: ICD-10-CM

## 2024-10-01 RX ORDER — TRAZODONE HYDROCHLORIDE 50 MG/1
50-100 TABLET, FILM COATED ORAL AT BEDTIME
Qty: 180 TABLET | Refills: 3 | OUTPATIENT
Start: 2024-10-01

## 2025-01-03 ENCOUNTER — MYC REFILL (OUTPATIENT)
Dept: FAMILY MEDICINE | Facility: CLINIC | Age: 55
End: 2025-01-03
Payer: COMMERCIAL

## 2025-01-03 DIAGNOSIS — F43.22 ADJUSTMENT DISORDER WITH ANXIOUS MOOD: ICD-10-CM

## 2025-01-03 DIAGNOSIS — G47.9 SLEEP DISORDER: ICD-10-CM

## 2025-01-06 RX ORDER — TRAZODONE HYDROCHLORIDE 50 MG/1
50-100 TABLET, FILM COATED ORAL AT BEDTIME
Qty: 180 TABLET | Refills: 3 | OUTPATIENT
Start: 2025-01-06

## 2025-01-06 RX ORDER — TRAZODONE HYDROCHLORIDE 50 MG/1
50-100 TABLET, FILM COATED ORAL AT BEDTIME
Qty: 180 TABLET | Refills: 0 | Status: SHIPPED | OUTPATIENT
Start: 2025-01-06

## 2025-01-06 NOTE — TELEPHONE ENCOUNTER
The patient called, frustrated because his refill was denied. I explained that it might have been due to having refills available, but he insisted that the pharmacy told him he was out of refills. He stated he is out of medication and is not listening to the explanation. Please advise on how to proceed.

## 2025-03-15 ENCOUNTER — HEALTH MAINTENANCE LETTER (OUTPATIENT)
Age: 55
End: 2025-03-15

## 2025-04-09 DIAGNOSIS — F43.22 ADJUSTMENT DISORDER WITH ANXIOUS MOOD: ICD-10-CM

## 2025-04-09 DIAGNOSIS — G47.9 SLEEP DISORDER: ICD-10-CM

## 2025-04-10 ENCOUNTER — VIRTUAL VISIT (OUTPATIENT)
Dept: FAMILY MEDICINE | Facility: CLINIC | Age: 55
End: 2025-04-10
Payer: COMMERCIAL

## 2025-04-10 DIAGNOSIS — R09.81 NASAL CONGESTION: Primary | ICD-10-CM

## 2025-04-10 DIAGNOSIS — F43.22 ADJUSTMENT DISORDER WITH ANXIOUS MOOD: ICD-10-CM

## 2025-04-10 DIAGNOSIS — G47.9 SLEEP DISORDER: ICD-10-CM

## 2025-04-10 RX ORDER — TRAZODONE HYDROCHLORIDE 50 MG/1
50-100 TABLET ORAL AT BEDTIME
Qty: 180 TABLET | Refills: 0 | Status: SHIPPED | OUTPATIENT
Start: 2025-04-10 | End: 2025-04-10

## 2025-04-10 RX ORDER — TRAZODONE HYDROCHLORIDE 50 MG/1
50-100 TABLET ORAL AT BEDTIME
Qty: 180 TABLET | Refills: 3 | Status: SHIPPED | OUTPATIENT
Start: 2025-04-10

## 2025-04-10 NOTE — PROGRESS NOTES
"CATA is a 54 year old who is being evaluated via a billable video visit.    How would you like to obtain your AVS? MyChart  If the video visit is dropped, the invitation should be resent by: Text to cell phone: [phone]  Will anyone else be joining your video visit? No      Assessment & Plan     ICD-10-CM    1. Nasal congestion  R09.81       2. Adjustment disorder with anxious mood  F43.22 traZODone (DESYREL) 50 MG tablet      3. Sleep disorder  G47.9 traZODone (DESYREL) 50 MG tablet        Patient here for trazodone refill.  In the long run, he wants to wean off self trazodone.  Right now, he has lost his current job and feels this may not be the right time to do so.  Trazodone refilled with verbal instructions on weaning off.  He also complains of frequent urgent care visits with nasal congestion and respiratory infections requiring antibiotic.  He uses Afrin for nasal congestion.  Recommend trial of Flonase and antihistamine.  Follow-up recommended if not improving or worsening.    Healthcare maintenance: Recommend yearly annual physical visit.  Recommend he get his pneumonia and shingles vaccine.  Patient verbalizes understanding.    BMI  Estimated body mass index is 27.32 kg/m  as calculated from the following:    Height as of 4/11/24: 1.753 m (5' 9\").    Weight as of 4/11/24: 83.9 kg (185 lb).         MEDICATIONS:  Continue current medications without change    Subjective   CATA is a 54 year old, presenting for the following health issues:  Recheck Medication (Trazadone)    History of Present Illness       Reason for visit:  Get Rx re fill   He is taking medications regularly.        Patient Active Problem List   Diagnosis    History of ulcerative colitis    History of total colectomy    Nephrolithiasis    S/P colectomy     Current Outpatient Medications   Medication Sig Dispense Refill    traZODone (DESYREL) 50 MG tablet Take 1-2 tablets ( mg) by mouth at bedtime. 180 tablet 3     No current " facility-administered medications for this visit.           Review of Systems  Constitutional, neuro, ENT, endocrine, pulmonary, cardiac, gastrointestinal, genitourinary, musculoskeletal, integument and psychiatric systems are negative, except as otherwise noted.      Objective           Vitals:  No vitals were obtained today due to virtual visit.    Physical Exam   GENERAL: alert and no distress  EYES: Eyes grossly normal to inspection.  No discharge or erythema, or obvious scleral/conjunctival abnormalities.  RESP: No audible wheeze, cough, or visible cyanosis.    SKIN: Visible skin clear. No significant rash, abnormal pigmentation or lesions.  NEURO: Cranial nerves grossly intact.  Mentation and speech appropriate for age.  PSYCH: Appropriate affect, tone, and pace of words          Video-Visit Details    Type of service:  Video Visit   Originating Location (pt. Location): Home    Distant Location (provider location):  On-site  Platform used for Video Visit: Rama  Signed Electronically by: Maggi Moss MD